# Patient Record
Sex: MALE | Race: WHITE | NOT HISPANIC OR LATINO | Employment: OTHER | RURAL
[De-identification: names, ages, dates, MRNs, and addresses within clinical notes are randomized per-mention and may not be internally consistent; named-entity substitution may affect disease eponyms.]

---

## 2019-08-22 ENCOUNTER — OFFICE VISIT (OUTPATIENT)
Dept: FAMILY MEDICINE CLINIC | Facility: CLINIC | Age: 54
End: 2019-08-22

## 2019-08-22 VITALS
OXYGEN SATURATION: 93 % | HEART RATE: 59 BPM | WEIGHT: 239.6 LBS | BODY MASS INDEX: 32.45 KG/M2 | RESPIRATION RATE: 18 BRPM | TEMPERATURE: 98 F | SYSTOLIC BLOOD PRESSURE: 126 MMHG | HEIGHT: 72 IN | DIASTOLIC BLOOD PRESSURE: 79 MMHG

## 2019-08-22 DIAGNOSIS — K21.9 GASTROESOPHAGEAL REFLUX DISEASE WITHOUT ESOPHAGITIS: ICD-10-CM

## 2019-08-22 DIAGNOSIS — Z00.00 PREVENTATIVE HEALTH CARE: Primary | ICD-10-CM

## 2019-08-22 DIAGNOSIS — Z12.11 COLON CANCER SCREENING: ICD-10-CM

## 2019-08-22 DIAGNOSIS — R06.09 DYSPNEA ON EXERTION: ICD-10-CM

## 2019-08-22 DIAGNOSIS — Z12.5 SCREENING FOR PROSTATE CANCER: ICD-10-CM

## 2019-08-22 DIAGNOSIS — Z13.220 SCREENING, LIPID: ICD-10-CM

## 2019-08-22 DIAGNOSIS — B19.20 HEPATITIS C VIRUS INFECTION WITHOUT HEPATIC COMA, UNSPECIFIED CHRONICITY: ICD-10-CM

## 2019-08-22 DIAGNOSIS — F17.200 TOBACCO DEPENDENCE: ICD-10-CM

## 2019-08-22 DIAGNOSIS — R80.0 ISOLATED PROTEINURIA WITHOUT SPECIFIC MORPHOLOGIC LESION: ICD-10-CM

## 2019-08-22 PROBLEM — Z87.898 HISTORY OF SUBSTANCE USE DISORDER: Status: ACTIVE | Noted: 2019-08-22

## 2019-08-22 LAB
BILIRUB BLD-MCNC: NEGATIVE MG/DL
CLARITY, POC: CLEAR
COLOR UR: YELLOW
GLUCOSE UR STRIP-MCNC: NEGATIVE MG/DL
KETONES UR QL: NEGATIVE
LEUKOCYTE EST, POC: NEGATIVE
NITRITE UR-MCNC: NEGATIVE MG/ML
PH UR: 7 [PH] (ref 5–8)
PROT UR STRIP-MCNC: ABNORMAL MG/DL
RBC # UR STRIP: NEGATIVE /UL
SP GR UR: 1.02 (ref 1–1.03)
UROBILINOGEN UR QL: NORMAL

## 2019-08-22 PROCEDURE — 99203 OFFICE O/P NEW LOW 30 MIN: CPT | Performed by: NURSE PRACTITIONER

## 2019-08-22 PROCEDURE — 99386 PREV VISIT NEW AGE 40-64: CPT | Performed by: NURSE PRACTITIONER

## 2019-08-22 PROCEDURE — 81003 URINALYSIS AUTO W/O SCOPE: CPT | Performed by: NURSE PRACTITIONER

## 2019-08-22 RX ORDER — VARENICLINE TARTRATE 1 MG/1
1 TABLET, FILM COATED ORAL 2 TIMES DAILY
Qty: 60 TABLET | Refills: 4 | Status: SHIPPED | OUTPATIENT
Start: 2019-08-22 | End: 2022-12-08

## 2019-08-22 NOTE — PATIENT INSTRUCTIONS
For more information:    Quit Now [place]  1-800-QUIT-NOW  Steps to Quit Smoking  Smoking tobacco can be harmful to your health and can affect almost every organ in your body. Smoking puts you, and those around you, at risk for developing many serious chronic diseases. Quitting smoking is difficult, but it is one of the best things that you can do for your health. It is never too late to quit.  What are the benefits of quitting smoking?  When you quit smoking, you lower your risk of developing serious diseases and conditions, such as:  · Lung cancer or lung disease, such as COPD.  · Heart disease.  · Stroke.  · Heart attack.  · Infertility.  · Osteoporosis and bone fractures.  Additionally, symptoms such as coughing, wheezing, and shortness of breath may get better when you quit. You may also find that you get sick less often because your body is stronger at fighting off colds and infections. If you are pregnant, quitting smoking can help to reduce your chances of having a baby of low birth weight.  How do I get ready to quit?  When you decide to quit smoking, create a plan to make sure that you are successful. Before you quit:  · Pick a date to quit. Set a date within the next two weeks to give you time to prepare.  · Write down the reasons why you are quitting. Keep this list in places where you will see it often, such as on your bathroom mirror or in your car or wallet.  · Identify the people, places, things, and activities that make you want to smoke (triggers) and avoid them. Make sure to take these actions:  ¨ Throw away all cigarettes at home, at work, and in your car.  ¨ Throw away smoking accessories, such as ashtrays and lighters.  ¨ Clean your car and make sure to empty the ashtray.  ¨ Clean your home, including curtains and carpets.  · Tell your family, friends, and coworkers that you are quitting. Support from your loved ones can make quitting easier.  · Talk with your health care provider about your  options for quitting smoking.  · Find out what treatment options are covered by your health insurance.  What strategies can I use to quit smoking?  Talk with your healthcare provider about different strategies to quit smoking. Some strategies include:  · Quitting smoking altogether instead of gradually lessening how much you smoke over a period of time. Research shows that quitting “cold turkey” is more successful than gradually quitting.  · Attending in-person counseling to help you build problem-solving skills. You are more likely to have success in quitting if you attend several counseling sessions. Even short sessions of 10 minutes can be effective.  · Finding resources and support systems that can help you to quit smoking and remain smoke-free after you quit. These resources are most helpful when you use them often. They can include:  ¨ Online chats with a counselor.  ¨ Telephone quitlines.  ¨ Printed self-help materials.  ¨ Support groups or group counseling.  ¨ Text messaging programs.  ¨ Mobile phone applications.  · Taking medicines to help you quit smoking. (If you are pregnant or breastfeeding, talk with your health care provider first.) Some medicines contain nicotine and some do not. Both types of medicines help with cravings, but the medicines that include nicotine help to relieve withdrawal symptoms. Your health care provider may recommend:  ¨ Nicotine patches, gum, or lozenges.  ¨ Nicotine inhalers or sprays.  ¨ Non-nicotine medicine that is taken by mouth.  Talk with your health care provider about combining strategies, such as taking medicines while you are also receiving in-person counseling. Using these two strategies together makes you more likely to succeed in quitting than if you used either strategy on its own.  If you are pregnant or breastfeeding, talk with your health care provider about finding counseling or other support strategies to quit smoking. Do not take medicine to help you quit  smoking unless told to do so by your health care provider.  What things can I do to make it easier to quit?  Quitting smoking might feel overwhelming at first, but there is a lot that you can do to make it easier. Take these important actions:  · Reach out to your family and friends and ask that they support and encourage you during this time. Call telephone quitlines, reach out to support groups, or work with a counselor for support.  · Ask people who smoke to avoid smoking around you.  · Avoid places that trigger you to smoke, such as bars, parties, or smoke-break areas at work.  · Spend time around people who do not smoke.  · Lessen stress in your life, because stress can be a smoking trigger for some people. To lessen stress, try:  ¨ Exercising regularly.  ¨ Deep-breathing exercises.  ¨ Yoga.  ¨ Meditating.  ¨ Performing a body scan. This involves closing your eyes, scanning your body from head to toe, and noticing which parts of your body are particularly tense. Purposefully relax the muscles in those areas.  · Download or purchase mobile phone or tablet apps (applications) that can help you stick to your quit plan by providing reminders, tips, and encouragement. There are many free apps, such as QuitGuide from the CDC (Centers for Disease Control and Prevention). You can find other support for quitting smoking (smoking cessation) through smokefree.gov and other websites.  How will I feel when I quit smoking?  Within the first 24 hours of quitting smoking, you may start to feel some withdrawal symptoms. These symptoms are usually most noticeable 2-3 days after quitting, but they usually do not last beyond 2-3 weeks. Changes or symptoms that you might experience include:  · Mood swings.  · Restlessness, anxiety, or irritation.  · Difficulty concentrating.  · Dizziness.  · Strong cravings for sugary foods in addition to nicotine.  · Mild weight gain.  · Constipation.  · Nausea.  · Coughing or a sore  throat.  · Changes in how your medicines work in your body.  · A depressed mood.  · Difficulty sleeping (insomnia).  After the first 2-3 weeks of quitting, you may start to notice more positive results, such as:  · Improved sense of smell and taste.  · Decreased coughing and sore throat.  · Slower heart rate.  · Lower blood pressure.  · Clearer skin.  · The ability to breathe more easily.  · Fewer sick days.  Quitting smoking is very challenging for most people. Do not get discouraged if you are not successful the first time. Some people need to make many attempts to quit before they achieve long-term success. Do your best to stick to your quit plan, and talk with your health care provider if you have any questions or concerns.  This information is not intended to replace advice given to you by your health care provider. Make sure you discuss any questions you have with your health care provider.  Document Released: 12/12/2002 Document Revised: 08/15/2017 Document Reviewed: 05/03/2016  Elsevier Interactive Patient Education © 2017 [employer]

## 2019-08-22 NOTE — PROGRESS NOTES
Chief Complaint   Patient presents with   • Annual Exam        Subjective     Neftali Rodrigues  has a past medical history of GERD (gastroesophageal reflux disease), Hepatitis C virus infection without hepatic coma (8/22/2019), History of substance use disorder (8/22/2019), and Tobacco dependence (8/22/2019).    HPI:  This is a new patient who presents for his annual wellness visit and to follow-up on some chronic problems.   He was diagnosed with Hepatitis C many years ago, presumably from injection drug use or a tattoo. He was treated by Dr. Rock (he thinks) with interferon. He reports completing the treatment, but he has not gone back to Dr. Rock for follow-up for several years. He has felt well. He denies abdominal pain, bowel problems, fatigue, jaundice.     He is interested in quitting smoking. He has been smoking 1 ppd for the last 40 years. He has not been successful at quitting in the past. He has mild dyspnea on exertion, but denies coughing or sputum production.     He also has heartburn frequently. This has been a problem for months. He believes it is because his new upper denture plate does not fit well and he doesn't get his food chewed up properly. If he really gets food chewed up well he doesn't have the problem. He denies dsyphagia, hoarseness, vomiting.     PHQ-2 Depression Screening  Little interest or pleasure in doing things? 0   Feeling down, depressed, or hopeless? 0   PHQ-2 Total Score 0   No Known Allergies      Current Outpatient Medications:   •  varenicline (CHANTIX CONTINUING MONTH AMEE) 1 MG tablet, Take 1 tablet by mouth 2 (Two) Times a Day., Disp: 60 tablet, Rfl: 4  •  varenicline (CHANTIX STARTING MONTH AMEE) 0.5 MG X 11 & 1 MG X 42 tablet, Take 0.5 mg one daily on days 1-3 and and 0.5 mg twice daily on days 4-7.Then 1 mg twice daily for a total of 12 weeks., Disp: 53 tablet, Rfl: 0    Past Medical History:   Diagnosis Date   • GERD (gastroesophageal reflux disease)    •  "Hepatitis C virus infection without hepatic coma 8/22/2019   • History of substance use disorder 8/22/2019   • Tobacco dependence 8/22/2019       Past Surgical History:   Procedure Laterality Date   • CYST REMOVAL  2012    in left groin (I & D)   • EYE SURGERY  05/2019    Laser - bilateral       Social History     Socioeconomic History   • Marital status:      Spouse name: Not on file   • Number of children: Not on file   • Years of education: Not on file   • Highest education level: Not on file   Tobacco Use   • Smoking status: Current Every Day Smoker     Packs/day: 1.00     Years: 40.00     Pack years: 40.00     Start date: 8/22/1979   • Smokeless tobacco: Never Used   Substance and Sexual Activity   • Alcohol use: No     Frequency: Never     Comment: Former 2015   • Drug use: Yes     Types: Benzodiazepines, Amphetamines, Heroin, Hydrocodone, Marijuana, Methamphetamines, Cocaine, \"Crack\" cocaine, LSD     Comment: Former 2015, multiple       Family History   Problem Relation Age of Onset   • Migraines Mother    • Lung cancer Mother    • Hypertension Father    • Alcohol abuse Father    • Diabetes Father    • Coronary artery disease Father    • Hypertension Brother    • Coronary artery disease Brother    • Hypertension Cousin    • Diabetes Cousin    • Arthritis Paternal Aunt    • Heart attack Paternal Uncle        Family history, surgical history, past medical history, Allergies and med's reviewed with patient today and updated in [facility] EMR.     ROS:  Review of Systems   Constitutional: Negative for appetite change, chills, diaphoresis, fatigue, fever, unexpected weight gain and unexpected weight loss.   HENT: Negative for congestion, ear discharge, ear pain, hearing loss, mouth sores, nosebleeds, postnasal drip, rhinorrhea, sinus pressure, sneezing, sore throat, tinnitus, trouble swallowing and voice change.    Eyes: Negative for blurred vision, double vision, photophobia, pain, discharge, redness, itching " "and visual disturbance.   Respiratory: Positive for shortness of breath. Negative for apnea, cough and wheezing.         W/exertion   Cardiovascular: Negative for chest pain, palpitations and leg swelling.   Gastrointestinal: Positive for GERD. Negative for abdominal distention, abdominal pain, blood in stool, constipation, diarrhea, nausea, rectal pain, vomiting and indigestion.        Thinks it is due to new dental plate - hard to chew his food up enough   Endocrine: Negative for cold intolerance, heat intolerance, polydipsia, polyphagia and polyuria.   Genitourinary: Negative for breast discharge, decreased libido, decreased urine volume, difficulty urinating, discharge, dysuria, flank pain, frequency, genital sores, hematuria, nocturia, penile pain, erectile dysfunction, penile swelling, scrotal swelling, testicular pain, urgency and urinary incontinence.   Musculoskeletal: Negative for arthralgias, back pain, joint swelling, myalgias, neck pain and neck stiffness.   Skin: Negative for rash and skin lesions.   Allergic/Immunologic: Negative for environmental allergies.   Neurological: Negative for dizziness, tremors, seizures, syncope, weakness, light-headedness, numbness, headache and memory problem.   Hematological: Negative for adenopathy. Does not bruise/bleed easily.   Psychiatric/Behavioral: Negative for self-injury, sleep disturbance, suicidal ideas and depressed mood. The patient is not nervous/anxious.      OBJECTIVE:  Vitals:    08/22/19 1320   BP: 126/79   BP Location: Left arm   Patient Position: Sitting   Cuff Size: Large Adult   Pulse: 59   Resp: 18   Temp: 98 °F (36.7 °C)   TempSrc: Oral   SpO2: 93%   Weight: 109 kg (239 lb 9.6 oz)   Height: 181.6 cm (71.5\")     Body mass index is 32.95 kg/m².    Physical Exam   Constitutional: He is oriented to person, place, and time. He appears well-developed and well-nourished.   HENT:   Head: Normocephalic and atraumatic.   Right Ear: External ear normal. " Tympanic membrane is not erythematous, not retracted and not bulging.   Left Ear: External ear normal. Tympanic membrane is not erythematous, not retracted and not bulging.   Nose: Nose normal. Right sinus exhibits no maxillary sinus tenderness and no frontal sinus tenderness. Left sinus exhibits no maxillary sinus tenderness and no frontal sinus tenderness.   Mouth/Throat: Oropharynx is clear and moist and mucous membranes are normal. No oropharyngeal exudate.   Eyes: Conjunctivae, EOM and lids are normal. Pupils are equal, round, and reactive to light. Right eye exhibits no discharge. Left eye exhibits no discharge.   Neck: Normal range of motion. Neck supple. Carotid bruit is not present. No tracheal deviation present. No thyromegaly present.   Cardiovascular: Normal rate, regular rhythm, normal heart sounds and intact distal pulses. Exam reveals no gallop and no friction rub.   No murmur heard.  Pulmonary/Chest: Effort normal and breath sounds normal. He has no wheezes. He has no rales.   Abdominal: Soft. Bowel sounds are normal. There is no hepatosplenomegaly. There is no tenderness. No hernia.   Musculoskeletal: Normal range of motion. He exhibits no edema or deformity.   Lymphadenopathy:     He has no cervical adenopathy.   Neurological: He is alert and oriented to person, place, and time. He has normal strength. He displays normal reflexes. Gait normal.   Skin: Skin is warm and dry. No rash noted.   Psychiatric: He has a normal mood and affect. His speech is normal and behavior is normal. Judgment and thought content normal.       Office Visit on 08/22/2019   Component Date Value Ref Range Status   • Color 08/22/2019 Yellow  Yellow, Straw, Dark Yellow, [place] Final   • Clarity, UA 08/22/2019 Clear  Clear Final   • Specific Gravity  08/22/2019 1.020  1.005 - 1.030 Final   • pH, Urine 08/22/2019 7.0  5.0 - 8.0 Final   • Leukocytes 08/22/2019 Negative  Negative Final   • Nitrite, UA 08/22/2019 Negative  Negative  Final   • Protein, POC 08/22/2019 30 mg/dL* Negative mg/dL Final   • Glucose, UA 08/22/2019 Negative  Negative, 1000 mg/dL (3+) mg/dL Final   • Ketones, UA 08/22/2019 Negative  Negative Final   • Urobilinogen, UA 08/22/2019 Normal  Normal Final   • Bilirubin 08/22/2019 Negative  Negative Final   • Blood, UA 08/22/2019 Negative  Negative Final       ASSESSMENT/ PLAN:    Diagnoses and all orders for this visit:    1. Preventative health care (Primary)  Comments:  Discussed age-appropriate health maintenance, prevention and promotion.   Given info for vascular screenings at [facility], including Cardiac Calcium score test.   Orders:  -     POC Urinalysis Dipstick, Automated    2. Hepatitis C virus infection without hepatic coma, unspecified chronicity  Comments:  Completed interferon treatment several years ago.   Has not seen GI in many years. Recommend return to Dr. Rock for follow-up.   Orders:  -     Ambulatory Referral to Gastroenterology  -     CBC & Differential; Future  -     Comprehensive Metabolic Panel; Future    3. 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   Checking Creatinine.     4. Tobacco dependence  Comments:  Discussed cessation techniques, risks/benefits of Chantix use.  Follow-up in 1 month.   Orders:  -     Spirometry Without Bronchodilator    5. Dyspnea on exertion  Comments:  Normal PFT's. Likely due to chronic tobacco use.   Given info for low-dose Chest CT at [facility].   f/up in 1 month.   Orders:  -     Spirometry Without Bronchodilator    6. Gastroesophageal reflux disease without esophagitis  Comments:  possible. He will discuss this with Dr. Rock also.     7. Colon cancer screening  Comments:  Given GSI packet. He will discuss with Dr. Rock when he sees him.   Orders:  -     Ambulatory Referral to Gastroenterology    8. Screening, lipid  -     Lipid Panel; Future    9. Screening for prostate cancer  Comments:  Reviewed risks/benefits  of PSA testing.  Orders:  -     PSA Screen; Future    Other orders  -     varenicline (CHANTIX STARTING MONTH AMEE) 0.5 MG X 11 & 1 MG X 42 tablet; Take 0.5 mg one daily on days 1-3 and and 0.5 mg twice daily on days 4-7.Then 1 mg twice daily for a total of 12 weeks.  Dispense: 53 tablet; Refill: 0  -     varenicline (CHANTIX CONTINUING MONTH AMEE) 1 MG tablet; Take 1 tablet by mouth 2 (Two) Times a Day.  Dispense: 60 tablet; Refill: 4        Orders Placed Today:     New Medications Ordered This Visit   Medications   • varenicline (CHANTIX STARTING MONTH AMEE) 0.5 MG X 11 & 1 MG X 42 tablet     Sig: Take 0.5 mg one daily on days 1-3 and and 0.5 mg twice daily on days 4-7.Then 1 mg twice daily for a total of 12 weeks.     Dispense:  53 tablet     Refill:  0   • varenicline (CHANTIX CONTINUING MONTH AMEE) 1 MG tablet     Sig: Take 1 tablet by mouth 2 (Two) Times a Day.     Dispense:  60 tablet     Refill:  4        Management Plan:     An After Visit Summary was printed and given to the patient at discharge.    Follow-up: Return in about 1 month (around 9/22/2019) for Recheck smoking, protein in urine.    MELANIE Marie 8/22/2019 4:47 PM  This note was electronically signed.

## 2019-08-23 ENCOUNTER — RESULTS ENCOUNTER (OUTPATIENT)
Dept: FAMILY MEDICINE CLINIC | Facility: CLINIC | Age: 54
End: 2019-08-23

## 2019-08-23 DIAGNOSIS — Z12.5 SCREENING FOR PROSTATE CANCER: ICD-10-CM

## 2019-08-23 DIAGNOSIS — Z13.220 SCREENING, LIPID: ICD-10-CM

## 2019-08-23 DIAGNOSIS — B19.20 HEPATITIS C VIRUS INFECTION WITHOUT HEPATIC COMA, UNSPECIFIED CHRONICITY: ICD-10-CM

## 2019-08-27 ENCOUNTER — TRANSCRIBE ORDERS (OUTPATIENT)
Dept: ADMINISTRATIVE | Facility: HOSPITAL | Age: 54
End: 2019-08-27

## 2019-08-27 DIAGNOSIS — Z13.6 ENCOUNTER FOR SCREENING FOR VASCULAR DISEASE: Primary | ICD-10-CM

## 2019-08-28 PROBLEM — E78.2 MIXED HYPERLIPIDEMIA: Status: ACTIVE | Noted: 2019-08-28

## 2019-08-28 LAB
ALBUMIN SERPL-MCNC: 4.2 G/DL (ref 3.5–5.5)
ALBUMIN/GLOB SERPL: 1.6 {RATIO} (ref 1.2–2.2)
ALP SERPL-CCNC: 57 IU/L (ref 39–117)
ALT SERPL-CCNC: 40 IU/L (ref 0–44)
AST SERPL-CCNC: 30 IU/L (ref 0–40)
BASOPHILS # BLD AUTO: 0.1 X10E3/UL (ref 0–0.2)
BASOPHILS NFR BLD AUTO: 1 %
BILIRUB SERPL-MCNC: 0.4 MG/DL (ref 0–1.2)
BUN SERPL-MCNC: 8 MG/DL (ref 6–24)
BUN/CREAT SERPL: 10 (ref 9–20)
CALCIUM SERPL-MCNC: 9.4 MG/DL (ref 8.7–10.2)
CHLORIDE SERPL-SCNC: 103 MMOL/L (ref 96–106)
CHOLEST SERPL-MCNC: 185 MG/DL (ref 100–199)
CO2 SERPL-SCNC: 21 MMOL/L (ref 20–29)
CREAT SERPL-MCNC: 0.79 MG/DL (ref 0.76–1.27)
EOSINOPHIL # BLD AUTO: 0.4 X10E3/UL (ref 0–0.4)
EOSINOPHIL NFR BLD AUTO: 5 %
ERYTHROCYTE [DISTWIDTH] IN BLOOD BY AUTOMATED COUNT: 12.9 % (ref 12.3–15.4)
GLOBULIN SER CALC-MCNC: 2.7 G/DL (ref 1.5–4.5)
GLUCOSE SERPL-MCNC: 84 MG/DL (ref 65–99)
HCT VFR BLD AUTO: 48.6 % (ref 37.5–51)
HDLC SERPL-MCNC: 39 MG/DL
HGB BLD-MCNC: 17.4 G/DL (ref 13–17.7)
IMM GRANULOCYTES # BLD AUTO: 0 X10E3/UL (ref 0–0.1)
IMM GRANULOCYTES NFR BLD AUTO: 0 %
LDLC SERPL CALC-MCNC: 102 MG/DL (ref 0–99)
LYMPHOCYTES # BLD AUTO: 2.8 X10E3/UL (ref 0.7–3.1)
LYMPHOCYTES NFR BLD AUTO: 34 %
MCH RBC QN AUTO: 32.1 PG (ref 26.6–33)
MCHC RBC AUTO-ENTMCNC: 35.8 G/DL (ref 31.5–35.7)
MCV RBC AUTO: 90 FL (ref 79–97)
MONOCYTES # BLD AUTO: 0.6 X10E3/UL (ref 0.1–0.9)
MONOCYTES NFR BLD AUTO: 8 %
NEUTROPHILS # BLD AUTO: 4.2 X10E3/UL (ref 1.4–7)
NEUTROPHILS NFR BLD AUTO: 52 %
PLATELET # BLD AUTO: 211 X10E3/UL (ref 150–450)
POTASSIUM SERPL-SCNC: 4.4 MMOL/L (ref 3.5–5.2)
PROT SERPL-MCNC: 6.9 G/DL (ref 6–8.5)
PSA SERPL-MCNC: 0.4 NG/ML (ref 0–4)
RBC # BLD AUTO: 5.42 X10E6/UL (ref 4.14–5.8)
SODIUM SERPL-SCNC: 140 MMOL/L (ref 134–144)
TRIGL SERPL-MCNC: 220 MG/DL (ref 0–149)
VLDLC SERPL CALC-MCNC: 44 MG/DL (ref 5–40)
WBC # BLD AUTO: 8.1 X10E3/UL (ref 3.4–10.8)

## 2019-09-04 PROBLEM — J43.8 OTHER EMPHYSEMA (HCC): Status: ACTIVE | Noted: 2019-09-04

## 2019-09-25 ENCOUNTER — OFFICE VISIT (OUTPATIENT)
Dept: FAMILY MEDICINE CLINIC | Facility: CLINIC | Age: 54
End: 2019-09-25

## 2019-09-25 VITALS
DIASTOLIC BLOOD PRESSURE: 82 MMHG | RESPIRATION RATE: 18 BRPM | WEIGHT: 241.2 LBS | HEART RATE: 72 BPM | SYSTOLIC BLOOD PRESSURE: 120 MMHG | BODY MASS INDEX: 32.67 KG/M2 | OXYGEN SATURATION: 92 % | HEIGHT: 72 IN | TEMPERATURE: 98.1 F

## 2019-09-25 DIAGNOSIS — Z12.11 COLON CANCER SCREENING: ICD-10-CM

## 2019-09-25 DIAGNOSIS — B19.20 HEPATITIS C VIRUS INFECTION WITHOUT HEPATIC COMA, UNSPECIFIED CHRONICITY: ICD-10-CM

## 2019-09-25 DIAGNOSIS — R80.9 PROTEINURIA, UNSPECIFIED TYPE: ICD-10-CM

## 2019-09-25 DIAGNOSIS — J43.8 OTHER EMPHYSEMA (HCC): Primary | ICD-10-CM

## 2019-09-25 DIAGNOSIS — R31.21 ASYMPTOMATIC MICROSCOPIC HEMATURIA: ICD-10-CM

## 2019-09-25 DIAGNOSIS — E78.2 MIXED HYPERLIPIDEMIA: ICD-10-CM

## 2019-09-25 DIAGNOSIS — F17.200 TOBACCO DEPENDENCE: ICD-10-CM

## 2019-09-25 DIAGNOSIS — K21.9 GASTROESOPHAGEAL REFLUX DISEASE WITHOUT ESOPHAGITIS: ICD-10-CM

## 2019-09-25 LAB
BILIRUB BLD-MCNC: NEGATIVE MG/DL
CLARITY, POC: CLEAR
COLOR UR: YELLOW
GLUCOSE UR STRIP-MCNC: NEGATIVE MG/DL
KETONES UR QL: NEGATIVE
LEUKOCYTE EST, POC: NEGATIVE
NITRITE UR-MCNC: NEGATIVE MG/ML
PH UR: 5.5 [PH] (ref 5–8)
PROT UR STRIP-MCNC: NEGATIVE MG/DL
RBC # UR STRIP: ABNORMAL /UL
SP GR UR: 1.02 (ref 1–1.03)
UROBILINOGEN UR QL: NORMAL

## 2019-09-25 PROCEDURE — 81003 URINALYSIS AUTO W/O SCOPE: CPT | Performed by: NURSE PRACTITIONER

## 2019-09-25 PROCEDURE — 99213 OFFICE O/P EST LOW 20 MIN: CPT | Performed by: NURSE PRACTITIONER

## 2019-09-25 NOTE — PROGRESS NOTES
Chief Complaint   Patient presents with   • Nicotine Dependence   • Proteinuria   • Hyperlipidemia   • COPD        Subjective     Neftali Rodrigues  has a past medical history of GERD (gastroesophageal reflux disease), Hepatitis C virus infection without hepatic coma (8/22/2019), History of substance use disorder (8/22/2019), Other emphysema (CMS/HCC) (9/4/2019), and Tobacco dependence (8/22/2019).    Hyperlipidemia   This is a new problem. This is a new diagnosis. The problem is uncontrolled. Recent lipid tests were reviewed and are variable. Factors aggravating his hyperlipidemia include smoking. Associated symptoms include shortness of breath. Pertinent negatives include no chest pain, leg pain or myalgias. He is currently on no antihyperlipidemic treatment. Risk factors for coronary artery disease include dyslipidemia, male sex and family history.   Smoking Cessation   He has been smoking 1 ppd for the last 40 years. He is taking Chantix and has been able to cut back to about 3/4 ppd. He is not having cravings. He has changed some of his daily routine to help him quit. He is not having nausea. He has had vivid dreams, but nothing disturbing. He has had no mood changes or depression.   Emphysema  He had a low-dose chest CT done at [facility]. It did not show any suspicious nodules, but it did show mild emphysema and mild calcifications of the arteries. He had normal PFT's at his August appointment. He has mild dyspnea on exertion, but denies coughing or sputum production.   Proteinuria  His Urinalysis in August showed 30 mg/dL protein. Serum creatinine was unremarkable.     No Known Allergies      Current Outpatient Medications:   •  varenicline (CHANTIX CONTINUING MONTH AMEE) 1 MG tablet, Take 1 tablet by mouth 2 (Two) Times a Day., Disp: 60 tablet, Rfl: 4    Past Medical History:   Diagnosis Date   • GERD (gastroesophageal reflux disease)    • Hepatitis C virus infection without hepatic coma  "8/22/2019   • History of substance use disorder 8/22/2019   • Other emphysema (CMS/HCC) 9/4/2019   • Tobacco dependence 8/22/2019       Past Surgical History:   Procedure Laterality Date   • CYST REMOVAL  2012    in left groin (I & D)   • EYE SURGERY  05/2019    Laser - bilateral       Social History     Socioeconomic History   • Marital status:      Spouse name: Not on file   • Number of children: Not on file   • Years of education: Not on file   • Highest education level: Not on file   Tobacco Use   • Smoking status: Current Every Day Smoker     Packs/day: 0.75     Years: 40.00     Pack years: 30.00     Start date: 8/22/1979   • Smokeless tobacco: Never Used   Substance and Sexual Activity   • Alcohol use: No     Frequency: Never     Comment: Former 2015   • Drug use: Yes     Types: Benzodiazepines, Amphetamines, Heroin, Hydrocodone, Marijuana, Methamphetamines, Cocaine, \"Crack\" cocaine, LSD     Comment: Former 2015, multiple       Family History   Problem Relation Age of Onset   • Migraines Mother    • Lung cancer Mother    • Hypertension Father    • Alcohol abuse Father    • Diabetes Father    • Coronary artery disease Father    • Hypertension Brother    • Coronary artery disease Brother    • Hypertension Cousin    • Diabetes Cousin    • Arthritis Paternal Aunt    • Heart attack Paternal Uncle        Family history, surgical history, past medical history, Allergies and med's reviewed with patient today and updated in [employer] EMR.     ROS:  Review of Systems   Constitutional: Negative for activity change, appetite change, diaphoresis, fatigue, unexpected weight gain and unexpected weight loss.   Eyes: Negative for blurred vision and visual disturbance.   Respiratory: Positive for shortness of breath. Negative for apnea, cough and wheezing.    Cardiovascular: Negative for chest pain, palpitations and leg swelling.   Gastrointestinal: Negative for abdominal pain, constipation, diarrhea, nausea and vomiting. " "  Endocrine: Negative for cold intolerance and heat intolerance.   Genitourinary: Negative for dysuria, flank pain, frequency and urgency.   Musculoskeletal: Negative for myalgias.   Neurological: Negative for dizziness, syncope and headache.   Psychiatric/Behavioral: Negative for depressed mood.       OBJECTIVE:  Vitals:    09/25/19 1524   BP: 120/82   BP Location: Left arm   Patient Position: Sitting   Cuff Size: Large Adult   Pulse: 72   Resp: 18   Temp: 98.1 °F (36.7 °C)   TempSrc: Oral   SpO2: 92%   Weight: 109 kg (241 lb 3.2 oz)   Height: 181.6 cm (71.5\")     Body mass index is 33.17 kg/m².    Physical Exam   Constitutional: He is oriented to person, place, and time. He appears well-developed and well-nourished.   Neck: Trachea normal and normal range of motion. Neck supple. Carotid bruit is not present. No thyromegaly present.   Cardiovascular: Normal rate, regular rhythm, normal heart sounds and intact distal pulses. Exam reveals no gallop and no friction rub.   No murmur heard.  Pulmonary/Chest: Effort normal and breath sounds normal. He has no wheezes. He has no rales.   Abdominal: Soft. Bowel sounds are normal. There is no hepatosplenomegaly. No hernia.   Musculoskeletal: Normal range of motion. He exhibits no edema.   Lymphadenopathy:     He has no cervical adenopathy.   Neurological: He is alert and oriented to person, place, and time.   Skin: Skin is warm and dry.   Psychiatric: He has a normal mood and affect. His behavior is normal. Judgment and thought content normal.       Office Visit on 09/25/2019   Component Date Value Ref Range Status   • Color 09/25/2019 Yellow  Yellow, Straw, Dark Yellow, Jesenia Final   • Clarity, UA 09/25/2019 Clear  Clear Final   • Specific Gravity  09/25/2019 1.025  1.005 - 1.030 Final   • pH, Urine 09/25/2019 5.5  5.0 - 8.0 Final   • Leukocytes 09/25/2019 Negative  Negative Final   • Nitrite, UA 09/25/2019 Negative  Negative Final   • Protein, POC 09/25/2019 Negative  " Negative mg/dL Final   • Glucose, UA 09/25/2019 Negative  Negative, 1000 mg/dL (3+) mg/dL Final   • Ketones, UA 09/25/2019 Negative  Negative Final   • Urobilinogen, UA 09/25/2019 Normal  Normal Final   • Bilirubin 09/25/2019 Negative  Negative Final   • Blood, UA 09/25/2019 Trace* Negative Final       ASSESSMENT/ PLAN:    Diagnoses and all orders for this visit:    1. Other emphysema (CMS/HCC) (Primary)  Comments:  Reviewed CT findings. Strongly encouraged tobacco cessation.     2. Proteinuria, unspecified type  Comments:  resolved.  Orders:  -     POC Urinalysis Dipstick, Automated    3. Mixed hyperlipidemia  Comments:  Reviewed his 10-year ASCVD risk. Recommended statin therapy. He would like to focus on tobacco cessation for now.     4. Asymptomatic microscopic hematuria  Comments:  Was not present last month. Will recheck in 6-8 weeks.     5. Tobacco dependence  Comments:  Encouraged him to set a stop date. Continue to wean down.  Continue Chantix.     6. Hepatitis C virus infection without hepatic coma, unspecified chronicity  Comments:  He has scheduled an appt w/GI.    7. Gastroesophageal reflux disease without esophagitis  Comments:  He will discuss w/GI.    8. Colon cancer screening  Comments:  Will discuss w/GI.        Orders Placed Today:     No orders of the defined types were placed in this encounter.       Management Plan:     An After Visit Summary was printed and given to the patient at discharge.    Follow-up: Return in about 2 months (around 11/25/2019) for Recheck hematuria, tobacco cessation.    MELANIE Marie 9/26/2019 5:18 PM  This note was electronically signed.

## 2019-10-07 ENCOUNTER — OFFICE (OUTPATIENT)
Dept: URBAN - METROPOLITAN AREA CLINIC 64 | Facility: CLINIC | Age: 54
End: 2019-10-07

## 2019-10-07 VITALS
HEIGHT: 73 IN | HEART RATE: 57 BPM | DIASTOLIC BLOOD PRESSURE: 76 MMHG | SYSTOLIC BLOOD PRESSURE: 117 MMHG | WEIGHT: 244 LBS

## 2019-10-07 DIAGNOSIS — B18.2 CHRONIC VIRAL HEPATITIS C: ICD-10-CM

## 2019-10-07 PROCEDURE — 99203 OFFICE O/P NEW LOW 30 MIN: CPT | Performed by: INTERNAL MEDICINE

## 2019-10-31 ENCOUNTER — HOSPITAL ENCOUNTER (OUTPATIENT)
Facility: HOSPITAL | Age: 54
Setting detail: HOSPITAL OUTPATIENT SURGERY
End: 2019-10-31
Attending: INTERNAL MEDICINE | Admitting: INTERNAL MEDICINE

## 2019-11-19 ENCOUNTER — APPOINTMENT (OUTPATIENT)
Dept: CT IMAGING | Facility: HOSPITAL | Age: 54
End: 2019-11-19

## 2019-11-19 ENCOUNTER — APPOINTMENT (OUTPATIENT)
Dept: CARDIOLOGY | Facility: HOSPITAL | Age: 54
End: 2019-11-19

## 2020-03-19 ENCOUNTER — TELEPHONE (OUTPATIENT)
Dept: FAMILY MEDICINE CLINIC | Facility: CLINIC | Age: 55
End: 2020-03-19

## 2020-03-19 NOTE — TELEPHONE ENCOUNTER
----- Message from MELANIE Marie sent at 3/19/2020  4:14 PM EDT -----  Regarding: [facility] records  Please get most recent office notes and labs from [facility]. Also see if they have done a colonoscopy.

## 2022-12-07 ENCOUNTER — APPOINTMENT (OUTPATIENT)
Dept: CT IMAGING | Facility: HOSPITAL | Age: 57
End: 2022-12-07

## 2022-12-07 ENCOUNTER — APPOINTMENT (OUTPATIENT)
Dept: GENERAL RADIOLOGY | Facility: HOSPITAL | Age: 57
End: 2022-12-07

## 2022-12-07 ENCOUNTER — HOSPITAL ENCOUNTER (INPATIENT)
Facility: HOSPITAL | Age: 57
LOS: 2 days | Discharge: HOME | End: 2022-12-09
Attending: EMERGENCY MEDICINE

## 2022-12-07 DIAGNOSIS — I26.99 OTHER ACUTE PULMONARY EMBOLISM WITHOUT ACUTE COR PULMONALE: ICD-10-CM

## 2022-12-07 DIAGNOSIS — J18.9 PNEUMONIA DUE TO INFECTIOUS ORGANISM, UNSPECIFIED LATERALITY, UNSPECIFIED PART OF LUNG: ICD-10-CM

## 2022-12-07 DIAGNOSIS — A41.9 SEPSIS, DUE TO UNSPECIFIED ORGANISM, UNSPECIFIED WHETHER ACUTE ORGAN DYSFUNCTION PRESENT: Primary | ICD-10-CM

## 2022-12-07 LAB
ALBUMIN SERPL-MCNC: 3.3 G/DL (ref 3.5–5.2)
ALBUMIN/GLOB SERPL: 1.1 G/DL
ALP SERPL-CCNC: 55 U/L (ref 39–117)
ALT SERPL W P-5'-P-CCNC: 28 U/L (ref 1–41)
ANION GAP SERPL CALCULATED.3IONS-SCNC: 19 MMOL/L (ref 5–15)
APTT PPP: 42.4 SECONDS (ref 61–76.5)
AST SERPL-CCNC: 28 U/L (ref 1–40)
B PARAPERT DNA SPEC QL NAA+PROBE: NOT DETECTED
B PERT DNA SPEC QL NAA+PROBE: NOT DETECTED
BILIRUB SERPL-MCNC: 0.7 MG/DL (ref 0–1.2)
BUN SERPL-MCNC: 38 MG/DL (ref 6–20)
BUN/CREAT SERPL: 23.5 (ref 7–25)
C PNEUM DNA NPH QL NAA+NON-PROBE: NOT DETECTED
CALCIUM SPEC-SCNC: 9.4 MG/DL (ref 8.6–10.5)
CHLORIDE SERPL-SCNC: 94 MMOL/L (ref 98–107)
CO2 SERPL-SCNC: 20 MMOL/L (ref 22–29)
CREAT SERPL-MCNC: 1.62 MG/DL (ref 0.76–1.27)
D DIMER PPP FEU-MCNC: 0.89 MG/L (FEU) (ref 0–0.58)
D-LACTATE SERPL-SCNC: 4 MMOL/L (ref 0.5–2)
DEPRECATED RDW RBC AUTO: 45.5 FL (ref 37–54)
EGFRCR SERPLBLD CKD-EPI 2021: 48.9 ML/MIN/1.73
ERYTHROCYTE [DISTWIDTH] IN BLOOD BY AUTOMATED COUNT: 13.4 % (ref 12.3–15.4)
FLUAV SUBTYP SPEC NAA+PROBE: NOT DETECTED
FLUBV RNA ISLT QL NAA+PROBE: NOT DETECTED
GLOBULIN UR ELPH-MCNC: 2.9 GM/DL
GLUCOSE SERPL-MCNC: 137 MG/DL (ref 65–99)
HADV DNA SPEC NAA+PROBE: NOT DETECTED
HCOV 229E RNA SPEC QL NAA+PROBE: NOT DETECTED
HCOV HKU1 RNA SPEC QL NAA+PROBE: NOT DETECTED
HCOV NL63 RNA SPEC QL NAA+PROBE: NOT DETECTED
HCOV OC43 RNA SPEC QL NAA+PROBE: NOT DETECTED
HCT VFR BLD AUTO: 46.9 % (ref 37.5–51)
HGB BLD-MCNC: 16.2 G/DL (ref 13–17.7)
HMPV RNA NPH QL NAA+NON-PROBE: NOT DETECTED
HPIV1 RNA ISLT QL NAA+PROBE: NOT DETECTED
HPIV2 RNA SPEC QL NAA+PROBE: NOT DETECTED
HPIV3 RNA NPH QL NAA+PROBE: NOT DETECTED
HPIV4 P GENE NPH QL NAA+PROBE: NOT DETECTED
INR PPP: 1.36 (ref 0.93–1.1)
LYMPHOCYTES # BLD MANUAL: 0.7 10*3/MM3 (ref 0.7–3.1)
LYMPHOCYTES NFR BLD MANUAL: 3 % (ref 5–12)
M PNEUMO IGG SER IA-ACNC: NOT DETECTED
MCH RBC QN AUTO: 32.5 PG (ref 26.6–33)
MCHC RBC AUTO-ENTMCNC: 34.7 G/DL (ref 31.5–35.7)
MCV RBC AUTO: 93.6 FL (ref 79–97)
METAMYELOCYTES NFR BLD MANUAL: 7 % (ref 0–0)
MONOCYTES # BLD: 0.42 10*3/MM3 (ref 0.1–0.9)
NEUTROPHILS # BLD AUTO: 11.82 10*3/MM3 (ref 1.7–7)
NEUTROPHILS NFR BLD MANUAL: 46 % (ref 42.7–76)
NEUTS BAND NFR BLD MANUAL: 39 % (ref 0–5)
PLAT MORPH BLD: NORMAL
PLATELET # BLD AUTO: 192 10*3/MM3 (ref 140–450)
PMV BLD AUTO: 7.8 FL (ref 6–12)
POTASSIUM SERPL-SCNC: 4.5 MMOL/L (ref 3.5–5.2)
PROT SERPL-MCNC: 6.2 G/DL (ref 6–8.5)
PROTHROMBIN TIME: 13.8 SECONDS (ref 9.6–11.7)
RBC # BLD AUTO: 5 10*6/MM3 (ref 4.14–5.8)
RBC MORPH BLD: NORMAL
RHINOVIRUS RNA SPEC NAA+PROBE: NOT DETECTED
RSV RNA NPH QL NAA+NON-PROBE: DETECTED
SARS-COV-2 RNA NPH QL NAA+NON-PROBE: NOT DETECTED
SCAN SLIDE: NORMAL
SODIUM SERPL-SCNC: 133 MMOL/L (ref 136–145)
TROPONIN T SERPL-MCNC: <0.01 NG/ML (ref 0–0.03)
VARIANT LYMPHS NFR BLD MANUAL: 5 % (ref 19.6–45.3)
WBC MORPH BLD: NORMAL
WBC NRBC COR # BLD: 13.9 10*3/MM3 (ref 3.4–10.8)

## 2022-12-07 PROCEDURE — 25010000002 HEPARIN (PORCINE) 25000-0.45 UT/250ML-% SOLUTION: Performed by: EMERGENCY MEDICINE

## 2022-12-07 PROCEDURE — 85730 THROMBOPLASTIN TIME PARTIAL: CPT | Performed by: EMERGENCY MEDICINE

## 2022-12-07 PROCEDURE — 0202U NFCT DS 22 TRGT SARS-COV-2: CPT | Performed by: EMERGENCY MEDICINE

## 2022-12-07 PROCEDURE — 93005 ELECTROCARDIOGRAM TRACING: CPT | Performed by: EMERGENCY MEDICINE

## 2022-12-07 PROCEDURE — 25010000002 HYDROMORPHONE 1 MG/ML SOLUTION: Performed by: EMERGENCY MEDICINE

## 2022-12-07 PROCEDURE — 0 IOPAMIDOL PER 1 ML: Performed by: EMERGENCY MEDICINE

## 2022-12-07 PROCEDURE — 71045 X-RAY EXAM CHEST 1 VIEW: CPT

## 2022-12-07 PROCEDURE — 83605 ASSAY OF LACTIC ACID: CPT

## 2022-12-07 PROCEDURE — 25010000002 CEFEPIME PER 500 MG: Performed by: EMERGENCY MEDICINE

## 2022-12-07 PROCEDURE — 85025 COMPLETE CBC W/AUTO DIFF WBC: CPT | Performed by: EMERGENCY MEDICINE

## 2022-12-07 PROCEDURE — 80053 COMPREHEN METABOLIC PANEL: CPT | Performed by: EMERGENCY MEDICINE

## 2022-12-07 PROCEDURE — 99285 EMERGENCY DEPT VISIT HI MDM: CPT

## 2022-12-07 PROCEDURE — 71275 CT ANGIOGRAPHY CHEST: CPT

## 2022-12-07 PROCEDURE — 84484 ASSAY OF TROPONIN QUANT: CPT | Performed by: EMERGENCY MEDICINE

## 2022-12-07 PROCEDURE — 83880 ASSAY OF NATRIURETIC PEPTIDE: CPT | Performed by: INTERNAL MEDICINE

## 2022-12-07 PROCEDURE — 25010000002 ONDANSETRON PER 1 MG: Performed by: EMERGENCY MEDICINE

## 2022-12-07 PROCEDURE — 85007 BL SMEAR W/DIFF WBC COUNT: CPT | Performed by: EMERGENCY MEDICINE

## 2022-12-07 PROCEDURE — 87040 BLOOD CULTURE FOR BACTERIA: CPT | Performed by: EMERGENCY MEDICINE

## 2022-12-07 PROCEDURE — 85379 FIBRIN DEGRADATION QUANT: CPT | Performed by: EMERGENCY MEDICINE

## 2022-12-07 PROCEDURE — 85610 PROTHROMBIN TIME: CPT | Performed by: EMERGENCY MEDICINE

## 2022-12-07 PROCEDURE — 93005 ELECTROCARDIOGRAM TRACING: CPT

## 2022-12-07 RX ORDER — ONDANSETRON 2 MG/ML
4 INJECTION INTRAMUSCULAR; INTRAVENOUS EVERY 6 HOURS PRN
Status: DISCONTINUED | OUTPATIENT
Start: 2022-12-07 | End: 2022-12-09 | Stop reason: HOSPADM

## 2022-12-07 RX ORDER — HEPARIN SODIUM 10000 [USP'U]/100ML
15.7 INJECTION, SOLUTION INTRAVENOUS
Status: DISCONTINUED | OUTPATIENT
Start: 2022-12-07 | End: 2022-12-09 | Stop reason: HOSPADM

## 2022-12-07 RX ORDER — SODIUM CHLORIDE 9 MG/ML
100 INJECTION, SOLUTION INTRAVENOUS CONTINUOUS
Status: DISCONTINUED | OUTPATIENT
Start: 2022-12-07 | End: 2022-12-09 | Stop reason: HOSPADM

## 2022-12-07 RX ORDER — ACETAMINOPHEN 325 MG/1
650 TABLET ORAL EVERY 4 HOURS PRN
Status: DISCONTINUED | OUTPATIENT
Start: 2022-12-07 | End: 2022-12-09 | Stop reason: HOSPADM

## 2022-12-07 RX ORDER — DOXYCYCLINE 100 MG/1
100 TABLET ORAL EVERY 12 HOURS SCHEDULED
Status: DISCONTINUED | OUTPATIENT
Start: 2022-12-08 | End: 2022-12-09 | Stop reason: HOSPADM

## 2022-12-07 RX ORDER — NICOTINE 21 MG/24HR
1 PATCH, TRANSDERMAL 24 HOURS TRANSDERMAL
Status: DISCONTINUED | OUTPATIENT
Start: 2022-12-08 | End: 2022-12-09 | Stop reason: HOSPADM

## 2022-12-07 RX ORDER — ONDANSETRON 2 MG/ML
4 INJECTION INTRAMUSCULAR; INTRAVENOUS ONCE
Status: COMPLETED | OUTPATIENT
Start: 2022-12-07 | End: 2022-12-07

## 2022-12-07 RX ORDER — SODIUM CHLORIDE 0.9 % (FLUSH) 0.9 %
10 SYRINGE (ML) INJECTION AS NEEDED
Status: DISCONTINUED | OUTPATIENT
Start: 2022-12-07 | End: 2022-12-09 | Stop reason: HOSPADM

## 2022-12-07 RX ORDER — SODIUM CHLORIDE 9 MG/ML
40 INJECTION, SOLUTION INTRAVENOUS AS NEEDED
Status: DISCONTINUED | OUTPATIENT
Start: 2022-12-07 | End: 2022-12-09 | Stop reason: HOSPADM

## 2022-12-07 RX ORDER — SODIUM CHLORIDE 0.9 % (FLUSH) 0.9 %
10 SYRINGE (ML) INJECTION EVERY 12 HOURS SCHEDULED
Status: DISCONTINUED | OUTPATIENT
Start: 2022-12-07 | End: 2022-12-09 | Stop reason: HOSPADM

## 2022-12-07 RX ADMIN — DOXYCYCLINE 100 MG: 100 INJECTION, POWDER, LYOPHILIZED, FOR SOLUTION INTRAVENOUS at 21:32

## 2022-12-07 RX ADMIN — SODIUM CHLORIDE 2859 ML: 9 INJECTION, SOLUTION INTRAVENOUS at 20:27

## 2022-12-07 RX ADMIN — IOPAMIDOL 100 ML: 755 INJECTION, SOLUTION INTRAVENOUS at 21:49

## 2022-12-07 RX ADMIN — ONDANSETRON 4 MG: 2 INJECTION INTRAMUSCULAR; INTRAVENOUS at 21:50

## 2022-12-07 RX ADMIN — HEPARIN SODIUM 15.7 UNITS/KG/HR: 10000 INJECTION, SOLUTION INTRAVENOUS at 23:10

## 2022-12-07 RX ADMIN — CEFEPIME 2 G: 2 INJECTION, POWDER, FOR SOLUTION INTRAVENOUS at 20:27

## 2022-12-07 RX ADMIN — HYDROMORPHONE HYDROCHLORIDE 1 MG: 1 INJECTION, SOLUTION INTRAMUSCULAR; INTRAVENOUS; SUBCUTANEOUS at 21:51

## 2022-12-08 ENCOUNTER — APPOINTMENT (OUTPATIENT)
Dept: CARDIOLOGY | Facility: HOSPITAL | Age: 57
End: 2022-12-08

## 2022-12-08 LAB
ANION GAP SERPL CALCULATED.3IONS-SCNC: 14 MMOL/L (ref 5–15)
APTT PPP: 65 SECONDS (ref 61–76.5)
APTT PPP: 77.7 SECONDS (ref 61–76.5)
BH CV ECHO MEAS - AO MAX PG: 9.8 MMHG
BH CV ECHO MEAS - AO MEAN PG: 5.5 MMHG
BH CV ECHO MEAS - AO ROOT DIAM: 3.9 CM
BH CV ECHO MEAS - AO V2 MAX: 156.5 CM/SEC
BH CV ECHO MEAS - AO V2 VTI: 25.4 CM
BH CV ECHO MEAS - AVA(I,D): 4 CM2
BH CV ECHO MEAS - EDV(CUBED): 112.8 ML
BH CV ECHO MEAS - EDV(MOD-SP4): 118.9 ML
BH CV ECHO MEAS - EF(MOD-BP): 45 %
BH CV ECHO MEAS - EF(MOD-SP4): 44.5 %
BH CV ECHO MEAS - ESV(CUBED): 44.5 ML
BH CV ECHO MEAS - ESV(MOD-SP4): 65.9 ML
BH CV ECHO MEAS - FS: 26.6 %
BH CV ECHO MEAS - IVS/LVPW: 1.16 CM
BH CV ECHO MEAS - IVSD: 0.98 CM
BH CV ECHO MEAS - LA A2CS (ATRIAL LENGTH): 2.8 CM
BH CV ECHO MEAS - LV DIASTOLIC VOL/BSA (35-75): 53.6 CM2
BH CV ECHO MEAS - LV MASS(C)D: 152.5 GRAMS
BH CV ECHO MEAS - LV MAX PG: 7.8 MMHG
BH CV ECHO MEAS - LV MEAN PG: 3.5 MMHG
BH CV ECHO MEAS - LV SYSTOLIC VOL/BSA (12-30): 29.7 CM2
BH CV ECHO MEAS - LV V1 MAX: 139.4 CM/SEC
BH CV ECHO MEAS - LV V1 VTI: 25.7 CM
BH CV ECHO MEAS - LVIDD: 4.8 CM
BH CV ECHO MEAS - LVIDS: 3.5 CM
BH CV ECHO MEAS - LVOT AREA: 3.9 CM2
BH CV ECHO MEAS - LVOT DIAM: 2.24 CM
BH CV ECHO MEAS - LVPWD: 0.85 CM
BH CV ECHO MEAS - MV A MAX VEL: 99.7 CM/SEC
BH CV ECHO MEAS - MV DEC SLOPE: 419.3 CM/SEC2
BH CV ECHO MEAS - MV DEC TIME: 0.22 MSEC
BH CV ECHO MEAS - MV E MAX VEL: 92.6 CM/SEC
BH CV ECHO MEAS - MV E/A: 0.93
BH CV ECHO MEAS - MV MAX PG: 4 MMHG
BH CV ECHO MEAS - MV MEAN PG: 2.5 MMHG
BH CV ECHO MEAS - MV V2 VTI: 22.4 CM
BH CV ECHO MEAS - MVA(VTI): 4.5 CM2
BH CV ECHO MEAS - PA V2 MAX: 121.5 CM/SEC
BH CV ECHO MEAS - QP/QS: 0.44
BH CV ECHO MEAS - RV MAX PG: 2.35 MMHG
BH CV ECHO MEAS - RV V1 MAX: 76.6 CM/SEC
BH CV ECHO MEAS - RV V1 VTI: 17.5 CM
BH CV ECHO MEAS - RVDD: 2.6 CM
BH CV ECHO MEAS - RVOT DIAM: 1.8 CM
BH CV ECHO MEAS - SI(MOD-SP4): 23.9 ML/M2
BH CV ECHO MEAS - SV(LVOT): 101 ML
BH CV ECHO MEAS - SV(MOD-SP4): 52.9 ML
BH CV ECHO MEAS - SV(RVOT): 44.3 ML
BH CV ECHO MEAS - TR MAX PG: 16 MMHG
BH CV ECHO MEAS - TR MAX VEL: 200.2 CM/SEC
BUN SERPL-MCNC: 31 MG/DL (ref 6–20)
BUN/CREAT SERPL: 26.7 (ref 7–25)
CALCIUM SPEC-SCNC: 8.3 MG/DL (ref 8.6–10.5)
CHLORIDE SERPL-SCNC: 95 MMOL/L (ref 98–107)
CK SERPL-CCNC: 121 U/L (ref 20–200)
CO2 SERPL-SCNC: 21 MMOL/L (ref 22–29)
CREAT SERPL-MCNC: 1.16 MG/DL (ref 0.76–1.27)
D-LACTATE SERPL-SCNC: 3.3 MMOL/L (ref 0.5–2)
DEPRECATED RDW RBC AUTO: 42.9 FL (ref 37–54)
EGFRCR SERPLBLD CKD-EPI 2021: 73 ML/MIN/1.73
EOSINOPHIL # BLD MANUAL: 0.27 10*3/MM3 (ref 0–0.4)
EOSINOPHIL NFR BLD MANUAL: 2 % (ref 0.3–6.2)
ERYTHROCYTE [DISTWIDTH] IN BLOOD BY AUTOMATED COUNT: 13.2 % (ref 12.3–15.4)
GLUCOSE SERPL-MCNC: 136 MG/DL (ref 65–99)
HCT VFR BLD AUTO: 45.2 % (ref 37.5–51)
HGB BLD-MCNC: 14.7 G/DL (ref 13–17.7)
LARGE PLATELETS: ABNORMAL
LYMPHOCYTES # BLD MANUAL: 1.08 10*3/MM3 (ref 0.7–3.1)
MAXIMAL PREDICTED HEART RATE: 162 BPM
MCH RBC QN AUTO: 31 PG (ref 26.6–33)
MCHC RBC AUTO-ENTMCNC: 32.6 G/DL (ref 31.5–35.7)
MCV RBC AUTO: 95.1 FL (ref 79–97)
METAMYELOCYTES NFR BLD MANUAL: 14 % (ref 0–0)
MRSA DNA SPEC QL NAA+PROBE: NORMAL
NEUTROPHILS # BLD AUTO: 10.26 10*3/MM3 (ref 1.7–7)
NEUTROPHILS NFR BLD MANUAL: 24 % (ref 42.7–76)
NEUTS BAND NFR BLD MANUAL: 52 % (ref 0–5)
NT-PROBNP SERPL-MCNC: 1203 PG/ML (ref 0–900)
PATHOLOGY REVIEW: YES
PLATELET # BLD AUTO: 178 10*3/MM3 (ref 140–450)
PMV BLD AUTO: 7.7 FL (ref 6–12)
POTASSIUM SERPL-SCNC: 4.2 MMOL/L (ref 3.5–5.2)
RBC # BLD AUTO: 4.75 10*6/MM3 (ref 4.14–5.8)
RBC MORPH BLD: NORMAL
SCAN SLIDE: NORMAL
SMALL PLATELETS BLD QL SMEAR: ADEQUATE
SODIUM SERPL-SCNC: 130 MMOL/L (ref 136–145)
STRESS TARGET HR: 138 BPM
TROPONIN T SERPL-MCNC: <0.01 NG/ML (ref 0–0.03)
TROPONIN T SERPL-MCNC: <0.01 NG/ML (ref 0–0.03)
VARIANT LYMPHS NFR BLD MANUAL: 8 % (ref 19.6–45.3)
WBC MORPH BLD: NORMAL
WBC NRBC COR # BLD: 13.5 10*3/MM3 (ref 3.4–10.8)

## 2022-12-08 PROCEDURE — 85730 THROMBOPLASTIN TIME PARTIAL: CPT | Performed by: INTERNAL MEDICINE

## 2022-12-08 PROCEDURE — 85730 THROMBOPLASTIN TIME PARTIAL: CPT | Performed by: EMERGENCY MEDICINE

## 2022-12-08 PROCEDURE — 93306 TTE W/DOPPLER COMPLETE: CPT

## 2022-12-08 PROCEDURE — 36415 COLL VENOUS BLD VENIPUNCTURE: CPT | Performed by: INTERNAL MEDICINE

## 2022-12-08 PROCEDURE — 85007 BL SMEAR W/DIFF WBC COUNT: CPT | Performed by: EMERGENCY MEDICINE

## 2022-12-08 PROCEDURE — 99222 1ST HOSP IP/OBS MODERATE 55: CPT | Performed by: INTERNAL MEDICINE

## 2022-12-08 PROCEDURE — 85025 COMPLETE CBC W/AUTO DIFF WBC: CPT | Performed by: EMERGENCY MEDICINE

## 2022-12-08 PROCEDURE — 84484 ASSAY OF TROPONIN QUANT: CPT | Performed by: INTERNAL MEDICINE

## 2022-12-08 PROCEDURE — 93306 TTE W/DOPPLER COMPLETE: CPT | Performed by: INTERNAL MEDICINE

## 2022-12-08 PROCEDURE — 87641 MR-STAPH DNA AMP PROBE: CPT | Performed by: INTERNAL MEDICINE

## 2022-12-08 PROCEDURE — 25010000002 HEPARIN (PORCINE) 25000-0.45 UT/250ML-% SOLUTION: Performed by: EMERGENCY MEDICINE

## 2022-12-08 PROCEDURE — 36415 COLL VENOUS BLD VENIPUNCTURE: CPT | Performed by: EMERGENCY MEDICINE

## 2022-12-08 PROCEDURE — 85007 BL SMEAR W/DIFF WBC COUNT: CPT | Performed by: INTERNAL MEDICINE

## 2022-12-08 PROCEDURE — 82550 ASSAY OF CK (CPK): CPT | Performed by: INTERNAL MEDICINE

## 2022-12-08 PROCEDURE — 80048 BASIC METABOLIC PNL TOTAL CA: CPT | Performed by: INTERNAL MEDICINE

## 2022-12-08 PROCEDURE — 85025 COMPLETE CBC W/AUTO DIFF WBC: CPT | Performed by: INTERNAL MEDICINE

## 2022-12-08 PROCEDURE — 83605 ASSAY OF LACTIC ACID: CPT

## 2022-12-08 PROCEDURE — 25010000002 CEFTRIAXONE PER 250 MG: Performed by: INTERNAL MEDICINE

## 2022-12-08 RX ORDER — GUAIFENESIN 600 MG/1
600 TABLET, EXTENDED RELEASE ORAL EVERY 12 HOURS SCHEDULED
Status: DISCONTINUED | OUTPATIENT
Start: 2022-12-08 | End: 2022-12-09 | Stop reason: HOSPADM

## 2022-12-08 RX ORDER — IPRATROPIUM BROMIDE AND ALBUTEROL SULFATE 2.5; .5 MG/3ML; MG/3ML
3 SOLUTION RESPIRATORY (INHALATION) EVERY 4 HOURS PRN
Status: DISCONTINUED | OUTPATIENT
Start: 2022-12-08 | End: 2022-12-09 | Stop reason: HOSPADM

## 2022-12-08 RX ORDER — MORPHINE SULFATE 2 MG/ML
2 INJECTION, SOLUTION INTRAMUSCULAR; INTRAVENOUS EVERY 4 HOURS PRN
Status: DISCONTINUED | OUTPATIENT
Start: 2022-12-08 | End: 2022-12-09 | Stop reason: HOSPADM

## 2022-12-08 RX ORDER — FAMOTIDINE 20 MG/1
20 TABLET, FILM COATED ORAL
Status: DISCONTINUED | OUTPATIENT
Start: 2022-12-08 | End: 2022-12-09 | Stop reason: HOSPADM

## 2022-12-08 RX ORDER — HYDROCODONE BITARTRATE AND ACETAMINOPHEN 7.5; 325 MG/1; MG/1
1 TABLET ORAL EVERY 6 HOURS PRN
Status: DISCONTINUED | OUTPATIENT
Start: 2022-12-08 | End: 2022-12-09 | Stop reason: HOSPADM

## 2022-12-08 RX ADMIN — HYDROCODONE BITARTRATE AND ACETAMINOPHEN 1 TABLET: 7.5; 325 TABLET ORAL at 21:50

## 2022-12-08 RX ADMIN — FAMOTIDINE 20 MG: 20 TABLET, FILM COATED ORAL at 18:14

## 2022-12-08 RX ADMIN — DOXYCYCLINE 100 MG: 100 TABLET ORAL at 21:50

## 2022-12-08 RX ADMIN — FAMOTIDINE 20 MG: 20 TABLET, FILM COATED ORAL at 08:40

## 2022-12-08 RX ADMIN — GUAIFENESIN 600 MG: 600 TABLET, EXTENDED RELEASE ORAL at 09:51

## 2022-12-08 RX ADMIN — Medication 10 ML: at 00:53

## 2022-12-08 RX ADMIN — DOXYCYCLINE 100 MG: 100 TABLET ORAL at 09:51

## 2022-12-08 RX ADMIN — NICOTINE 1 PATCH: 21 PATCH, EXTENDED RELEASE TRANSDERMAL at 08:41

## 2022-12-08 RX ADMIN — Medication 10 ML: at 08:40

## 2022-12-08 RX ADMIN — CEFTRIAXONE 1 G: 1 INJECTION, POWDER, FOR SOLUTION INTRAMUSCULAR; INTRAVENOUS at 05:23

## 2022-12-08 RX ADMIN — SODIUM CHLORIDE 100 ML/HR: 9 INJECTION, SOLUTION INTRAVENOUS at 22:23

## 2022-12-08 RX ADMIN — GUAIFENESIN 600 MG: 600 TABLET, EXTENDED RELEASE ORAL at 21:51

## 2022-12-08 RX ADMIN — GUAIFENESIN 600 MG: 600 TABLET, EXTENDED RELEASE ORAL at 00:58

## 2022-12-08 RX ADMIN — SODIUM CHLORIDE 100 ML/HR: 9 INJECTION, SOLUTION INTRAVENOUS at 00:55

## 2022-12-08 RX ADMIN — HYDROCODONE BITARTRATE AND ACETAMINOPHEN 1 TABLET: 7.5; 325 TABLET ORAL at 12:02

## 2022-12-08 RX ADMIN — Medication 10 ML: at 22:00

## 2022-12-08 RX ADMIN — HEPARIN SODIUM 13.1 UNITS/KG/HR: 10000 INJECTION, SOLUTION INTRAVENOUS at 11:07

## 2022-12-08 NOTE — H&P
[facility] Medicine Services      Patient Name: Neftali Rodrigues  : 1965  MRN: [id number]  Primary Care Physician:  Provider, No Known  Date of admission: 2022      Subjective      Chief Complaint: Shortness of air, fever, chest pain    History of Present Illness: Neftali Rodrigues is a 57 y.o. male who presented to [facility] on 2022 complaining of above.  He described developing fevers and chills about 3 days ago and then subsequently having severe 10 out of 10 sharp left-sided chest discomfort over the next few days.  He noted profound weakness and had endorsed pleurisy.  States that when he breathes through his nose he is able to control the chest pain though when he takes deep breaths the pain is severe.  He denies any known history of venothromboembolism.He denies any calf pain or swelling he denies any history of COPD though chart notes this diagnosis.  He is not chronically on any supplemental oxygen and does not take any inhalers at home.  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  He denies any known sick contacts.  He describes occasional some loose stools.  He also has a mild headache today.  No neck pain no photophobia.    Personal History     Past Medical History:   Diagnosis Date   • GERD (gastroesophageal reflux disease)    • Hepatitis C virus infection without hepatic coma 2019   • History of substance use disorder 2019   • Other emphysema (CMS/HCC) 2019   • Tobacco dependence 2019       Past Surgical History:   Procedure Laterality Date   • CYST REMOVAL      in left groin (I & D)   • EYE SURGERY  2019    Laser - bilateral       Family History: family history includes Alcohol abuse in his father; Arthritis in his paternal aunt; Coronary artery disease in his brother and father; Diabetes in his cousin and father; Heart attack in his  "paternal uncle; Hypertension in his brother, cousin, and father; Lung cancer in his mother; Migraines in his mother. Otherwise pertinent FHx was reviewed and not pertinent to current issue.    Social History:  reports that he has been smoking. He started smoking about 43 years ago. He has a 30.00 pack-year smoking history. He has never used smokeless tobacco. He reports current drug use. Drugs: Benzodiazepines, Amphetamines, Heroin, Hydrocodone, Marijuana, Methamphetamines, Cocaine(coke), \"Crack\" cocaine, and LSD. He reports that he does not drink alcohol.    Home Medications:  Prior to Admission Medications     Prescriptions Last Dose Informant Patient Reported? Taking?    varenicline (CHANTIX CONTINUING MONTH PAK) 1 MG tablet   No No    Take 1 tablet by mouth 2 (Two) Times a Day.            Allergies:  No Known Allergies    Objective      Vitals:   Temp:  [98.9 °F (37.2 °C)] 98.9 °F (37.2 °C)  Heart Rate:  [114-126] 114  Resp:  [22] 22  BP: (147)/(129) 147/129    Physical Exam   Physical Exam:     GEN/CONS:   Vitals noted above, NAD, AOX3, saturating well on room air, well-built white male   EYES:  Conjunctiva pink   ENMT:  NECK:  Atraumatic external nose/ears, Oropharynx clear  Symmetric, trachea midline   CV:  Regular, Reg S1/S2, no murmur   PULM:  Diminished breath sounds bilaterally likely secondary to inspiratory effort, no wheezes   GI:  : Soft, Nontender, Nondistended   Deferred.    MSK:  No cyanosis, No LE edema   NEURO:    Sensation and motor symmetric and grossly intact and nonfocal--moving 4 extremities.   PSYCH:   Appropriate mood and affect         Result Review    Result Review:  I have personally reviewed the results from the time of this admission to 12/7/2022 23:57 EST and agree with these findings:  [x]  Laboratory  []  Microbiology  [x]  Radiology  [x]  EKG/Telemetry   [x]  Cardiology/Vascular   []  Pathology  [x]  Old records  []  Other:  Most notable findings include:    LABS      Lab " 12/07/22 2007 12/07/22 2003   WBC  --  13.90*   HEMOGLOBIN  --  16.2   HEMATOCRIT  --  46.9   PLATELETS  --  192   NEUTROS ABS  --  11.82*   MCV  --  93.6   LACTATE 4.0*  --    PROTIME  --  13.8*   APTT  --  42.4*         Lab 12/07/22 2003   SODIUM 133*   POTASSIUM 4.5   CHLORIDE 94*   CO2 20.0*   ANION GAP 19.0*   BUN 38*   CREATININE 1.62*   EGFR 48.9*   GLUCOSE 137*   CALCIUM 9.4         Lab 12/07/22 2003   TOTAL PROTEIN 6.2   ALBUMIN 3.30*   GLOBULIN 2.9   ALT (SGPT) 28   AST (SGOT) 28   BILIRUBIN 0.7   ALK PHOS 55         Lab 12/07/22 2003   TROPONIN T <0.010   INR 1.36*                 Brief Urine Lab Results     None        ________________________________  MICRO  Microbiology Results (last 10 days)     Procedure Component Value - Date/Time    Respiratory Panel PCR w/COVID-19(SARS-CoV-2) [facility]E In-House, NP Swab in UTM/VTM, 3-4 HR TAT - Swab, Nasopharynx [[id number]]  (Abnormal) Collected: 12/07/22 2004    Lab Status: Final result Specimen: Swab from Nasopharynx Updated: 12/07/22 2126     ADENOVIRUS, PCR Not Detected     Coronavirus 229E Not Detected     Coronavirus HKU1 Not Detected     Coronavirus NL63 Not Detected     Coronavirus OC43 Not Detected     COVID19 Not Detected     Human Metapneumovirus Not Detected     Human Rhinovirus/Enterovirus Not Detected     Influenza A PCR Not Detected     Influenza B PCR Not Detected     Parainfluenza Virus 1 Not Detected     Parainfluenza Virus 2 Not Detected     Parainfluenza Virus 3 Not Detected     Parainfluenza Virus 4 Not Detected     RSV, PCR Detected     Bordetella pertussis pcr Not Detected     Bordetella parapertussis PCR Not Detected     Chlamydophila pneumoniae PCR Not Detected     Mycoplasma pneumo by PCR Not Detected    Narrative:      In the setting of a positive respiratory panel with a viral infection PLUS a negative procalcitonin without other underlying concern for bacterial infection, consider observing off antibiotics or  discontinuation of antibiotics and continue supportive care. If the respiratory panel is positive for atypical bacterial infection (Bordetella pertussis, Chlamydophila pneumoniae, or Mycoplasma pneumoniae), consider antibiotic de-escalation to target atypical bacterial infection.        ________________________________  RAD  XR Chest 1 View    Result Date: 12/7/2022  Impression: 1.     Airspace opacities and consolidation in the left mid and lower lung with suspicion for a small left effusion. 2.     Findings are suspicious for pneumonia given patient's history, but follow-up is recommended to ensure resolution.  Electronically Signed By-Sulaiman Espinoza MD On:12/7/2022 8:44 PM This report was finalized on [id number] by  Sulaiman Espinoza MD.    CT Angiogram Chest Pulmonary Embolism    Result Date: 12/7/2022  Impression: 1.     Suspected pulmonary embolism in the left upper lobe. 2.     Dense consolidation in the left upper lobe and lingula and additional airspace opacities and consolidation in the left lower lobe with trace pleural effusion. Findings are most suspicious for pneumonia, but follow-up is recommended to ensure resolution. 3.     Mild emphysema. 4.     Mediastinal and hilar lymphadenopathy, which may be reactive, but follow-up is again recommended. 5.     Mild reflux of contrast into the IVC and hepatic veins which could be seen with right heart failure. No definite septal deviation or pulmonary artery enlargement is seen at this time.  Electronically Signed By-Sulaiman Espinoza MD On:12/7/2022 10:25 PM This report was finalized on [id number] by  Sulaiman Espinoza MD.    ________________________________  CARDS     No results found for this or any previous visit.        Assessment & Plan        Active Hospital Problems:  Active Hospital Problems    Diagnosis    • **Sepsis, due to unspecified organism, unspecified whether acute organ dysfunction present (HCC)        Assessment:    Acute Left Upper Lobe Pulmonary  Embolus-Suspected  • Admission: Troponin within normal limits, saturating well on room air, not hypotensive/mildly tachycardic  • Imaging: CT PE protocol noted above.  See report for full details  • ECG: Personally reviewed.  12/7/2022 sinus tachycardia.  Heart rate 123.  Incomplete right bundle branch block.  Nonspecific ST-T wave findings.  No prior for comparison locally  Acute left upper lobe pneumonia  • Admission: Afebrile, WBC 13.9, lactate 4.0,  • Imaging: CT chest noted above.  See report for details  • Misc/Context: Possibly secondary bacterial infection from PE.  Acute RSV viral infection  • Admission: Respiratory viral panel positive for RSV  Sepsis-secondary to above  · Admission: See lab values as above  History of hepatitis C  • Admission: LFTs within normal limits  · Has been treated per pt  Tobacco use/nicotine dependence  · 1 PPD  Former substance abuse  · Endorses previous use of multiple agents  Overweight  · Body mass index is 26.99 kg/m².  Full code  · Discussed and verified    Discussion/Plan:   Admit to stepdown unit inpatient status  Heparin gtt at this time and consider transition to oral anticoagulant perhaps next 24 hours if remains stable.  Obtain echocardiogram in the morning.  Appears to be hemodynamically stable at this time and saturating well on room air.  He denies any known history of COPD and is not chronically on supplemental oxygen does not take any inhalers at home  Trend troponin.  Telemetry monitoring  Empiric antibiotics.  Follow blood cultures to guide therapy.  Check MRSA nasal swab  Monitor renal function with IV fluid hydration. Consider renal imaging if persistent renal dysfunction noted. Strict I/O  Trend lactate  JODY/OSCAR prn  Mucinex BID  Tobacco use cessation advised and counseling provided. Nicotine patch.   May consider pulmonary consultation in AM if necessary  H2B  Surveillance labs.  See orders for additional details.  Pharmacy to confirm home medications and  will resume/adjust appropriate medications if necessary once confirmed and notified as ordered.  Further recommendations to follow as inpatient course and evaluation proceeds.   Daytime hospitalist provider will continue forward care in the morning.       DVT prophylaxis:  Medical DVT prophylaxis orders are present.    CODE STATUS:       Admission Status:  I believe this patient meets inpatient status.    I discussed the patient's findings and my recommendations with patient.    This patient has been examined wearing appropriate Personal Protective Equipment     Signature: Altaf Griffin MD

## 2022-12-08 NOTE — CONSULTS
Hematology/Oncology Inpatient Consultation    Patient name: Neftali Rodrigues  : 1965  MRN: [id number]  Referring Provider: Dr. Schmitz  Reason for Consultation: Pulmonary embolism    Chief complaint: Chest pain, shortness of breath, fever    History of present illness:    Neftlai Rodrigues is a 57 y.o. male who presented to [facility] on 2022 with complaints of chest pain, shortness of breath, fever and chills.  Past medical history included GERD, substance abuse disorder, emphysema, tobacco dependence, hepatitis C infection. Patient reported developing fever and chills 3 days prior to presentation followed by severe 10 out of 10 sharp left-sided chest discomfort, profound weakness, and pleurisy.  He also noted occasional loose stools and a mild headache.  He stated that the chest pain was more severe with deep breaths.  He denied any known history of thromboembolism.  He denied any calf pain or swelling.  He denied any known sick contacts.  He reported being fairly active and doing manual activity routinely.    Evaluation in the ED: Negative troponin and cardiac enzymes.  Lactate elevated at 4.0, proBNP elevated at 1203, D-dimer elevated at 0.89.  PT 13.8, INR 1.36, PTT 42.4.  WBC 13.90, hemoglobin 16.2, MCV 93.6, platelets 192,000, bands 39%.  Respiratory panel positive for RSV.  CT of the chest PE protocol with suspicion for a nonocclusive thrombus in the left upper lobe, left upper lobe, lingula, and left lower lobe consolidation suspicious for pneumonia, mediastinal and hilar lymphadenopathy AP window lymph node measuring approximately 11 x 15 mm.  Follow-up recommended.  Mild reflux of contrast into the IVC and hepatic veins which could be seen with right heart failure.  No definite septal deviation or pulmonary artery enlargement seen.  Patient was initiated on heparin drip.    22  Hematology/Oncology was consulted for pulmonary embolism.    He/She  has a past medical  "history of GERD (gastroesophageal reflux disease), Hepatitis C virus infection without hepatic coma (8/22/2019), History of substance use disorder (8/22/2019), Other emphysema (CMS/HCC) (9/4/2019), and Tobacco dependence (8/22/2019).    PCP: Provider, No Known    History:  Past Medical History:   Diagnosis Date   • GERD (gastroesophageal reflux disease)    • Hepatitis C virus infection without hepatic coma 8/22/2019   • History of substance use disorder 8/22/2019   • Other emphysema (CMS/HCC) 9/4/2019   • Tobacco dependence 8/22/2019   ,   Past Surgical History:   Procedure Laterality Date   • CYST REMOVAL  2012    in left groin (I & D)   • EYE SURGERY  05/2019    Laser - bilateral   ,   Family History   Problem Relation Age of Onset   • Migraines Mother    • Lung cancer Mother    • Hypertension Father    • Alcohol abuse Father    • Diabetes Father    • Coronary artery disease Father    • Hypertension Brother    • Coronary artery disease Brother    • Hypertension Cousin    • Diabetes Cousin    • Arthritis Paternal Aunt    • Heart attack Paternal Uncle    ,   Social History     Tobacco Use   • Smoking status: Every Day     Packs/day: 0.75     Years: 40.00     Pack years: 30.00     Types: Cigarettes     Start date: 8/22/1979   • Smokeless tobacco: Never   Substance Use Topics   • Alcohol use: No     Comment: Former 2015   • Drug use: Yes     Types: Benzodiazepines, Amphetamines, Heroin, Hydrocodone, Marijuana, Methamphetamines, Cocaine(coke), \"Crack\" cocaine, LSD     Comment: Former 2015, multiple   , (Not in a hospital admission)  , Scheduled Meds:  cefTRIAXone, 1 g, Intravenous, Q24H  doxycycline, 100 mg, Oral, Q12H  famotidine, 20 mg, Oral, BID AC  guaiFENesin, 600 mg, Oral, Q12H  nicotine, 1 patch, Transdermal, Q24H  sodium chloride, 10 mL, Intravenous, Q12H    , Continuous Infusions:  heparin, 15.7 Units/kg/hr, Last Rate: 13.1 Units/kg/hr (12/08/22 1107)  sodium chloride, 100 mL/hr, Last Rate: 100 mL/hr (12/08/22 " "0310)    , PRN Meds:  •  acetaminophen  •  heparin  •  heparin  •  HYDROcodone-acetaminophen  •  ipratropium-albuterol  •  Morphine  •  ondansetron  •  [COMPLETED] Insert Peripheral IV **AND** sodium chloride  •  sodium chloride  •  sodium chloride   Allergies:  Patient has no known allergies.    Subjective     ROS:  Review of Systems     Objective   Vital Signs:   /64 (BP Location: Right arm, Patient Position: Lying)   Pulse 103   Temp 98.1 °F (36.7 °C) (Oral)   Resp 25   Ht 188 cm (74\")   Wt 95.3 kg (210 lb)   SpO2 94%   BMI 26.96 kg/m²     Physical Exam: (performed by MD)  Physical Exam    Results Review:  Lab Results (last 48 hours)     Procedure Component Value Units Date/Time    Troponin [[id number]]  (Normal) Collected: 12/08/22 0511    Specimen: Blood from Arm, Left Updated: 12/08/22 0547     Troponin T <0.010 ng/mL     Narrative:      Troponin T Reference Range:  <= 0.03 ng/mL-   Negative for AMI  >0.03 ng/mL-     Abnormal for myocardial necrosis.  Clinicians would have to utilize clinical acumen, EKG, Troponin and serial changes to determine if it is an Acute Myocardial Infarction or myocardial injury due to an underlying chronic condition.       Results may be falsely decreased if patient taking Biotin.      Scan Slide [[id number]] Collected: 12/08/22 0427    Specimen: Blood from Arm, Left Updated: 12/08/22 0526     Scan Slide --     Comment: See Manual Differential Results       CBC Auto Differential [[id number]]  (Abnormal) Collected: 12/08/22 0427    Specimen: Blood from Arm, Left Updated: 12/08/22 0526     WBC 13.50 10*3/mm3      RBC 4.75 10*6/mm3      Hemoglobin 14.7 g/dL      Hematocrit 45.2 %      MCV 95.1 fL      MCH 31.0 pg      MCHC 32.6 g/dL      RDW 13.2 %      RDW-SD 42.9 fl      MPV 7.7 fL      Platelets 178 10*3/mm3     Narrative:      Modified report. Previous result was Hemogram on 12/8/2022 at 0444 EST.  The previously reported component NRBC is no longer being reported. " Previous result was 0.0 /100 WBC (Reference Range: 0.0-0.2 /100 WBC) on 12/8/2022 at 0444 EST.    Manual Differential [[id number]]  (Abnormal) Collected: 12/08/22 0427    Specimen: Blood from Arm, Left Updated: 12/08/22 0526     Neutrophil % 24.0 %      Lymphocyte % 8.0 %      Eosinophil % 2.0 %      Bands %  52.0 %      Metamyelocyte % 14.0 %      Neutrophils Absolute 10.26 10*3/mm3      Lymphocytes Absolute 1.08 10*3/mm3      Eosinophils Absolute 0.27 10*3/mm3      RBC Morphology Normal     WBC Morphology Normal     Platelet Estimate Adequate     Large Platelets Slight/1+    Path Consult Reflex [[id number]] Collected: 12/08/22 0427    Specimen: Blood from Arm, Left Updated: 12/08/22 0523     Pathology Review Yes    aPTT [[id number]]  (Abnormal) Collected: 12/08/22 0427    Specimen: Blood from Arm, Left Updated: 12/08/22 0458     PTT 77.7 seconds     Basic Metabolic Panel [[id number]]  (Abnormal) Collected: 12/08/22 0427    Specimen: Blood from Arm, Left Updated: 12/08/22 0454     Glucose 136 mg/dL      BUN 31 mg/dL      Creatinine 1.16 mg/dL      Sodium 130 mmol/L      Potassium 4.2 mmol/L      Comment: Slight hemolysis detected by analyzer. Results may be affected.        Chloride 95 mmol/L      CO2 21.0 mmol/L      Calcium 8.3 mg/dL      BUN/Creatinine Ratio 26.7     Anion Gap 14.0 mmol/L      eGFR 73.0 mL/min/1.73      Comment: National Kidney Foundation and American Society of Nephrology (ASN) Task Force recommended calculation based on the Chronic Kidney Disease Epidemiology Collaboration (CKD-EPI) equation refit without adjustment for race.       Narrative:      GFR Normal >60  Chronic Kidney Disease <60  Kidney Failure <15      MRSA Screen, PCR (Inpatient) - Swab, Nares [[id number]]  (Normal) Collected: 12/08/22 0057    Specimen: Swab from Nares Updated: 12/08/22 0224     MRSA PCR No MRSA Detected    Narrative:      The negative predictive value of this diagnostic test is high and should only be used to consider  de-escalating anti-MRSA therapy. A positive result may indicate colonization with MRSA and must be correlated clinically.    STAT Lactic Acid, Reflex [[id number]]  (Abnormal) Collected: 12/08/22 0051    Specimen: Blood Updated: 12/08/22 0125     Lactate 3.3 mmol/L     Troponin [[id number]]  (Normal) Collected: 12/08/22 0051    Specimen: Blood Updated: 12/08/22 0123     Troponin T <0.010 ng/mL     Narrative:      Troponin T Reference Range:  <= 0.03 ng/mL-   Negative for AMI  >0.03 ng/mL-     Abnormal for myocardial necrosis.  Clinicians would have to utilize clinical acumen, EKG, Troponin and serial changes to determine if it is an Acute Myocardial Infarction or myocardial injury due to an underlying chronic condition.       Results may be falsely decreased if patient taking Biotin.      CK [[id number]]  (Normal) Collected: 12/08/22 0051    Specimen: Blood Updated: 12/08/22 0123     Creatine Kinase 121 U/L     BNP [[id number]]  (Abnormal) Collected: 12/07/22 2003    Specimen: Blood Updated: 12/08/22 0036     proBNP 1,203.0 pg/mL     Narrative:      Among patients with dyspnea, NT-proBNP is highly sensitive for the detection of acute congestive heart failure. In addition NT-proBNP of <300 pg/ml effectively rules out acute congestive heart failure with 99% negative predictive value.      Respiratory Panel PCR w/COVID-19(SARS-CoV-2) [facility] In-House, NP Swab in [place], 3-4 HR TAT - Swab, Nasopharynx [[id number]]  (Abnormal) Collected: 12/07/22 2004    Specimen: Swab from Nasopharynx Updated: 12/07/22 2126     ADENOVIRUS, PCR Not Detected     Coronavirus 229E Not Detected     Coronavirus HKU1 Not Detected     Coronavirus NL63 Not Detected     Coronavirus OC43 Not Detected     COVID19 Not Detected     Human Metapneumovirus Not Detected     Human Rhinovirus/Enterovirus Not Detected     Influenza A PCR Not Detected     Influenza B PCR Not Detected     Parainfluenza Virus 1 Not Detected     Parainfluenza  Virus 2 Not Detected     Parainfluenza Virus 3 Not Detected     Parainfluenza Virus 4 Not Detected     RSV, PCR Detected     Bordetella pertussis pcr Not Detected     Bordetella parapertussis PCR Not Detected     Chlamydophila pneumoniae PCR Not Detected     Mycoplasma pneumo by PCR Not Detected    Narrative:      In the setting of a positive respiratory panel with a viral infection PLUS a negative procalcitonin without other underlying concern for bacterial infection, consider observing off antibiotics or discontinuation of antibiotics and continue supportive care. If the respiratory panel is positive for atypical bacterial infection (Bordetella pertussis, Chlamydophila pneumoniae, or Mycoplasma pneumoniae), consider antibiotic de-escalation to target atypical bacterial infection.    Manual Differential [[id number]]  (Abnormal) Collected: 12/07/22 2003    Specimen: Blood Updated: 12/07/22 2116     Neutrophil % 46.0 %      Lymphocyte % 5.0 %      Monocyte % 3.0 %      Bands %  39.0 %      Metamyelocyte % 7.0 %      Neutrophils Absolute 11.82 10*3/mm3      Lymphocytes Absolute 0.70 10*3/mm3      Monocytes Absolute 0.42 10*3/mm3      RBC Morphology Normal     WBC Morphology Normal     Platelet Morphology Normal    CBC & Differential [[id number]]  (Abnormal) Collected: 12/07/22 2003    Specimen: Blood Updated: 12/07/22 2116    Narrative:      The following orders were created for panel order CBC & Differential.  Procedure                               Abnormality         Status                     ---------                               -----------         ------                     CBC Auto Differential[[id number]]        Abnormal            Final result               Scan Slide[[id number]]                                       Final result                 Please view results for these tests on the individual orders.    CBC Auto Differential [[id number]]  (Abnormal) Collected: 12/07/22 2003    Specimen: Blood Updated:  "12/07/22 2116     WBC 13.90 10*3/mm3      RBC 5.00 10*6/mm3      Hemoglobin 16.2 g/dL      Hematocrit 46.9 %      MCV 93.6 fL      MCH 32.5 pg      MCHC 34.7 g/dL      RDW 13.4 %      RDW-SD 45.5 fl      MPV 7.8 fL      Platelets 192 10*3/mm3     Narrative:      Modified report. Previous result was Hemogram on 12/7/2022 at 2029 EST.  The previously reported component NRBC is no longer being reported. Previous result was 0.0 /100 WBC (Reference Range: 0.0-0.2 /100 WBC) on 12/7/2022 at 2029 EST.    Scan Slide [[id number]] Collected: 12/07/22 2003    Specimen: Blood Updated: 12/07/22 2116     Scan Slide --     Comment: See Manual Differential Results       Protime-INR [[id number]]  (Abnormal) Collected: 12/07/22 2003    Specimen: Blood Updated: 12/07/22 2039     Protime 13.8 Seconds      INR 1.36    aPTT [[id number]]  (Abnormal) Collected: 12/07/22 2003    Specimen: Blood Updated: 12/07/22 2039     PTT 42.4 seconds     D-dimer, Quantitative [[id number]]  (Abnormal) Collected: 12/07/22 2003    Specimen: Blood Updated: 12/07/22 2039     D-Dimer, Quantitative 0.89 mg/L (FEU)     Narrative:      According to the assay 's published package insert, a normal (<0.50 mg/L (FEU)) D-dimer result in conjunction with a non-high clinical probability assessment, excludes deep vein thrombosis (DVT) and pulmonary embolism (PE) with high sensitivity.    D-dimer values increase with age and this can make VTE exclusion of an older population difficult. To address this, the American College of Physicians, based on best available evidence and recent guidelines, recommends that clinicians use age-adjusted D-dimer thresholds in patients greater than 50 years of age with: a) a low probability of PE who do not meet all Pulmonary Embolism Rule Out Criteria, or b) in those with intermediate probability of PE.   The formula for an age-adjusted D-dimer cut-off is \"age/100\".  For example, a 60 year old patient would have an age-adjusted " cut-off of 0.60 mg/L (FEU) and an 80 year old 0.80 mg/L (FEU).    Comprehensive Metabolic Panel [[id number]]  (Abnormal) Collected: 12/07/22 2003    Specimen: Blood Updated: 12/07/22 2035     Glucose 137 mg/dL      BUN 38 mg/dL      Creatinine 1.62 mg/dL      Sodium 133 mmol/L      Potassium 4.5 mmol/L      Chloride 94 mmol/L      CO2 20.0 mmol/L      Calcium 9.4 mg/dL      Total Protein 6.2 g/dL      Albumin 3.30 g/dL      ALT (SGPT) 28 U/L      AST (SGOT) 28 U/L      Alkaline Phosphatase 55 U/L      Total Bilirubin 0.7 mg/dL      Globulin 2.9 gm/dL      A/G Ratio 1.1 g/dL      BUN/Creatinine Ratio 23.5     Anion Gap 19.0 mmol/L      eGFR 48.9 mL/min/1.73      Comment: National Kidney Foundation and American Society of Nephrology (ASN) Task Force recommended calculation based on the Chronic Kidney Disease Epidemiology Collaboration (CKD-EPI) equation refit without adjustment for race.       Narrative:      GFR Normal >60  Chronic Kidney Disease <60  Kidney Failure <15      Troponin [[id number]]  (Normal) Collected: 12/07/22 2003    Specimen: Blood Updated: 12/07/22 2035     Troponin T <0.010 ng/mL     Narrative:      Troponin T Reference Range:  <= 0.03 ng/mL-   Negative for AMI  >0.03 ng/mL-     Abnormal for myocardial necrosis.  Clinicians would have to utilize clinical acumen, EKG, Troponin and serial changes to determine if it is an Acute Myocardial Infarction or myocardial injury due to an underlying chronic condition.       Results may be falsely decreased if patient taking Biotin.      POC Lactate [[id number]]  (Abnormal) Collected: 12/07/22 2007    Specimen: Blood Updated: 12/07/22 2008     Lactate 4.0 mmol/L      Comment: Serial Number: [id number]:  [id number]       Blood Culture - Blood, Arm, Right [[id number]] Collected: 12/07/22 2004    Specimen: Blood from Arm, Right Updated: 12/07/22 2007    Blood Culture - Blood, Arm, Left [[id number]] Collected: 12/07/22 2004    Specimen: Blood from Arm, Left  Updated: 12/07/22 2007           Pending Results: Bilateral venous Doppler ultrasound of the lower extremities    Imaging Reviewed:   XR Chest 1 View    Result Date: 12/7/2022  1.     Airspace opacities and consolidation in the left mid and lower lung with suspicion for a small left effusion. 2.     Findings are suspicious for pneumonia given patient's history, but follow-up is recommended to ensure resolution.  Electronically Signed By-Sulaiman Espinoza MD On:12/7/2022 8:44 PM This report was finalized on [id number] by  Sulaiman Espinoza MD.    CT Angiogram Chest Pulmonary Embolism    Result Date: 12/7/2022  1.     Suspected pulmonary embolism in the left upper lobe. 2.     Dense consolidation in the left upper lobe and lingula and additional airspace opacities and consolidation in the left lower lobe with trace pleural effusion. Findings are most suspicious for pneumonia, but follow-up is recommended to ensure resolution. 3.     Mild emphysema. 4.     Mediastinal and hilar lymphadenopathy, which may be reactive, but follow-up is again recommended. 5.     Mild reflux of contrast into the IVC and hepatic veins which could be seen with right heart failure. No definite septal deviation or pulmonary artery enlargement is seen at this time.  Electronically Signed By-Sulaiman Espinoza MD On:12/7/2022 10:25 PM This report was finalized on [id number] by  Sulaiman Espinoza MD.           Assessment & Plan   ASSESSMENT  1. Acute left upper lobe pulmonary embolism: We will obtain bilateral venous Doppler ultrasound of the lower extremities.  Continue heparin drip and plan to transition to oral anticoagulation for discharge.  Plan for duration of 3 to 6 months.  2. Mediastinal and hilar lymphadenopathy: Possibly reactive, follow-up imaging recommended.  3. Acute left upper lobe pneumonia/RSV infection/sepsis: On empiric antibiotics.  4. SHANON: On IV fluid hydration.  5. Tobacco abuse: Encourage total cessation    PLAN  1. As  above    Electronically signed by Joellen Denton, MELANIE, 12/08/22, 9:18 AM EST.    I was asked to see Mr. Rodrigues, who came in the hospital with dyspnea, cough and left sided chest pain. It had started approximately 3 days before with intense chills and fevers. He had been spending more time in bed for at least 48 hours. He gave a history of intense cigarette smoking that had been going on for at least 40 years. At the time of the admission he had imaging of the chest a suspected pulmonary embolism as well as dense consolidation of the left upper lobe and lingula with additional air space opacities and consolidation of the left lower lobe. Trace pleural effusion was present. This was felt to be most consistent with pneumonia. Additionally suggestion of right heart failure was present, with reflux of the intravenous contrast into the inferior vena cava and the hepatic veins. On direct questioning, he reported no events close to the admission. He had been working full time in remodeling. He had been eating well and his weight had been stable. He denied any history of heart disease or of chronic lung disease. No history of digestive symptoms. On exam he appeared to be a chronically ill appearing man. He seemed older than the stated age but was not in distress. No jaundice. No oral lesions and respirations not labored. Frequent wet cough. On auscultation the lungs were diminished bilaterally and with crackles in both bases. The heart tachycardic but regular. The abdomen soft and not tender and no edema. Independently reviewed the images of the ct scan of the chest. Unclear whether indeed he has a pulmonary embolism but the present illness and the suggestion of embolism on the scan are concordant and he should be treated with anticoagulation at least for 3 months. He could be transitioned to an oral anticoagulant in the morning. Will keep on intravenous unfractionated heparin for now. He is to continue antibiotics.  Awaiting cultures. Reviewed all the records and reviewed the family history. All laboratory exams were reviewed. Discussed with him results.   I have discussed with Ms. Bertin HONG. I concur with her note.     Galdino Hunt MD on 12/8/2022 at 18:00

## 2022-12-08 NOTE — ED PROVIDER NOTES
Subjective   History of Present Illness  Chief complaint: Fever    57-year-old male presents with fever, cough, congestion, shortness of breath, chest pain.  Patient states symptoms started yesterday and have gotten progressively worse.  He has had headache.  He states his chest discomfort is worse with deep breaths or cough.  He denies any known sick contacts.  No known alleviating or exacerbating factors.  Symptoms described as moderate to severe.    History provided by:  Patient      Review of Systems   Constitutional: Positive for fever.   HENT: Positive for congestion.    Eyes: Negative for redness.   Respiratory: Positive for cough and shortness of breath.    Cardiovascular: Positive for chest pain.   Gastrointestinal: Positive for diarrhea. Negative for abdominal pain and vomiting.   Genitourinary: Negative for dysuria.   Musculoskeletal: Negative for back pain.   Skin: Negative for rash.   Neurological: Positive for headaches. Negative for dizziness.   Psychiatric/Behavioral: Negative for confusion.       Past Medical History:   Diagnosis Date   • GERD (gastroesophageal reflux disease)    • Hepatitis C virus infection without hepatic coma 8/22/2019   • History of substance use disorder 8/22/2019   • Other emphysema (CMS/HCC) 9/4/2019   • Tobacco dependence 8/22/2019       No Known Allergies    Past Surgical History:   Procedure Laterality Date   • CYST REMOVAL  2012    in left groin (I & D)   • EYE SURGERY  05/2019    Laser - bilateral       Family History   Problem Relation Age of Onset   • Migraines Mother    • Lung cancer Mother    • Hypertension Father    • Alcohol abuse Father    • Diabetes Father    • Coronary artery disease Father    • Hypertension Brother    • Coronary artery disease Brother    • Hypertension Cousin    • Diabetes Cousin    • Arthritis Paternal Aunt    • Heart attack Paternal Uncle        Social History     Socioeconomic History   • Marital status:    Tobacco Use   • Smoking  "status: Every Day     Packs/day: 0.75     Years: 40.00     Pack years: 30.00     Types: Cigarettes     Start date: 8/22/1979   • Smokeless tobacco: Never   Substance and Sexual Activity   • Alcohol use: No     Comment: Former 2015   • Drug use: Yes     Types: Benzodiazepines, Amphetamines, Heroin, Hydrocodone, Marijuana, Methamphetamines, Cocaine(coke), \"Crack\" cocaine, LSD     Comment: Former 2015, multiple       BP (!) 147/129 (BP Location: Left arm, Patient Position: Sitting)   Pulse 114   Temp 98.9 °F (37.2 °C) (Oral)   Resp 22   Ht 188 cm (74\")   Wt 95.3 kg (210 lb 3.2 oz)   SpO2 99%   BMI 26.99 kg/m² '      Objective   Physical Exam  Vitals and nursing note reviewed.   Constitutional:       Appearance: He is well-developed. He is ill-appearing.   HENT:      Head: Normocephalic and atraumatic.   Eyes:      Pupils: Pupils are equal, round, and reactive to light.   Cardiovascular:      Rate and Rhythm: Regular rhythm. Tachycardia present.      Heart sounds: Normal heart sounds.   Pulmonary:      Effort: Pulmonary effort is normal. Tachypnea present. No respiratory distress.      Breath sounds: Normal breath sounds.   Abdominal:      General: Bowel sounds are normal.      Palpations: Abdomen is soft.      Tenderness: There is no abdominal tenderness.   Skin:     General: Skin is warm and dry.   Neurological:      Mental Status: He is alert and oriented to person, place, and time.         Procedures           ED Course      My interpretation of EKG shows sinus tachycardia, rate of 123, no ST elevation     Results for orders placed or performed during the hospital encounter of 12/07/22   Respiratory Panel PCR w/COVID-19(SARS-CoV-2) [facility] In-House, NP Swab in UTM/VTM, 3-4 HR TAT - Swab, Nasopharynx    Specimen: Nasopharynx; Swab   Result Value Ref Range    ADENOVIRUS, PCR Not Detected Not Detected    Coronavirus 229E Not Detected Not Detected    Coronavirus HKU1 Not Detected Not Detected "    Coronavirus NL63 Not Detected Not Detected    Coronavirus OC43 Not Detected Not Detected    COVID19 Not Detected Not Detected - Ref. Range    Human Metapneumovirus Not Detected Not Detected    Human Rhinovirus/Enterovirus Not Detected Not Detected    Influenza A PCR Not Detected Not Detected    Influenza B PCR Not Detected Not Detected    Parainfluenza Virus 1 Not Detected Not Detected    Parainfluenza Virus 2 Not Detected Not Detected    Parainfluenza Virus 3 Not Detected Not Detected    Parainfluenza Virus 4 Not Detected Not Detected    RSV, PCR Detected (A) Not Detected    Bordetella pertussis pcr Not Detected Not Detected    Bordetella parapertussis PCR Not Detected Not Detected    Chlamydophila pneumoniae PCR Not Detected Not Detected    Mycoplasma pneumo by PCR Not Detected Not Detected   Comprehensive Metabolic Panel    Specimen: Blood   Result Value Ref Range    Glucose 137 (H) 65 - 99 mg/dL    BUN 38 (H) 6 - 20 mg/dL    Creatinine 1.62 (H) 0.76 - 1.27 mg/dL    Sodium 133 (L) 136 - 145 mmol/L    Potassium 4.5 3.5 - 5.2 mmol/L    Chloride 94 (L) 98 - 107 mmol/L    CO2 20.0 (L) 22.0 - 29.0 mmol/L    Calcium 9.4 8.6 - 10.5 mg/dL    Total Protein 6.2 6.0 - 8.5 g/dL    Albumin 3.30 (L) 3.50 - 5.20 g/dL    ALT (SGPT) 28 1 - 41 U/L    AST (SGOT) 28 1 - 40 U/L    Alkaline Phosphatase 55 39 - 117 U/L    Total Bilirubin 0.7 0.0 - 1.2 mg/dL    Globulin 2.9 gm/dL    A/G Ratio 1.1 g/dL    BUN/Creatinine Ratio 23.5 7.0 - 25.0    Anion Gap 19.0 (H) 5.0 - 15.0 mmol/L    eGFR 48.9 (L) >60.0 mL/min/1.73   Protime-INR    Specimen: Blood   Result Value Ref Range    Protime 13.8 (H) 9.6 - 11.7 Seconds    INR 1.36 (H) 0.93 - 1.10   aPTT    Specimen: Blood   Result Value Ref Range    PTT 42.4 (L) 61.0 - 76.5 seconds   Troponin    Specimen: Blood   Result Value Ref Range    Troponin T <0.010 0.000 - 0.030 ng/mL   D-dimer, Quantitative    Specimen: Blood   Result Value Ref Range    D-Dimer, Quantitative 0.89 (H) 0.00 - 0.58 mg/L  (FEU)   CBC Auto Differential    Specimen: Blood   Result Value Ref Range    WBC 13.90 (H) 3.40 - 10.80 10*3/mm3    RBC 5.00 4.14 - 5.80 10*6/mm3    Hemoglobin 16.2 13.0 - 17.7 g/dL    Hematocrit 46.9 37.5 - 51.0 %    MCV 93.6 79.0 - 97.0 fL    MCH 32.5 26.6 - 33.0 pg    MCHC 34.7 31.5 - 35.7 g/dL    RDW 13.4 12.3 - 15.4 %    RDW-SD 45.5 37.0 - 54.0 fl    MPV 7.8 6.0 - 12.0 fL    Platelets 192 140 - 450 10*3/mm3   Scan Slide    Specimen: Blood   Result Value Ref Range    Scan Slide     Manual Differential    Specimen: Blood   Result Value Ref Range    Neutrophil % 46.0 42.7 - 76.0 %    Lymphocyte % 5.0 (L) 19.6 - 45.3 %    Monocyte % 3.0 (L) 5.0 - 12.0 %    Bands %  39.0 (H) 0.0 - 5.0 %    Metamyelocyte % 7.0 (H) 0.0 - 0.0 %    Neutrophils Absolute 11.82 (H) 1.70 - 7.00 10*3/mm3    Lymphocytes Absolute 0.70 0.70 - 3.10 10*3/mm3    Monocytes Absolute 0.42 0.10 - 0.90 10*3/mm3    RBC Morphology Normal Normal    WBC Morphology Normal Normal    Platelet Morphology Normal Normal   POC Lactate    Specimen: Blood   Result Value Ref Range    Lactate 4.0 (C) 0.5 - 2.0 mmol/L   ECG 12 Lead Chest Pain   Result Value Ref Range    QT Interval 285 ms     XR Chest 1 View    Result Date: 12/7/2022  1.     Airspace opacities and consolidation in the left mid and lower lung with suspicion for a small left effusion. 2.     Findings are suspicious for pneumonia given patient's history, but follow-up is recommended to ensure resolution.  Electronically Signed By-Sulaiman Espinoza MD On:12/7/2022 8:44 PM This report was finalized on [id number] by  Sulaiman Espinoza MD.    CT Angiogram Chest Pulmonary Embolism    Result Date: 12/7/2022  1.     Suspected pulmonary embolism in the left upper lobe. 2.     Dense consolidation in the left upper lobe and lingula and additional airspace opacities and consolidation in the left lower lobe with trace pleural effusion. Findings are most suspicious for pneumonia, but follow-up is recommended to ensure  resolution. 3.     Mild emphysema. 4.     Mediastinal and hilar lymphadenopathy, which may be reactive, but follow-up is again recommended. 5.     Mild reflux of contrast into the IVC and hepatic veins which could be seen with right heart failure. No definite septal deviation or pulmonary artery enlargement is seen at this time.  Electronically Signed By-Sulaiman Espinoza MD On:12/7/2022 10:25 PM This report was finalized on [id number] by  Sulaiman Espinoza MD.                                    MDM   Patient had the above evaluation.  Results were discussed with the patient.  Patient did meet sepsis criteria and sepsis protocol was initiated.  Lactic acid was 4.0.  White blood cell count was 13.9 with 39% bands.  Blood cultures were obtained.  Patient was given a 30 cc/kg fluid bolus.  He was started on IV antibiotics.  Chest x-ray is showing left-sided pneumonia.  D-dimer was borderline elevated so CT PE protocol was obtained which showed possible PE in the left upper lobe.  There is reflux of contrast into the IVC and hepatic veins which could be seen with right heart failure.  Patient was started on heparin.  Respiratory panel was positive for RSV.  Patient has acute kidney injury with a BUN of 38 and creatinine 1.62.  His blood pressure remained stable throughout his ER stay.  I discussed with the hospitalist and the patient will be admitted to the PCU.  Critical care time excluding any procedure time totaled 35 minutes.    Final diagnoses:   Sepsis, due to unspecified organism, unspecified whether acute organ dysfunction present (HCC)   Pneumonia due to infectious organism, unspecified laterality, unspecified part of lung   Other acute pulmonary embolism without acute cor pulmonale (HCC)       ED Disposition  ED Disposition     ED Disposition   Decision to Admit    Condition   --    Comment   Level of Care: Progressive Care [20]   Admitting Physician: LAKISHA RAIN [[id number]]               No follow-up provider  specified.       Medication List      No changes were made to your prescriptions during this visit.          Deepak Mckeon MD  12/07/22 [id number]

## 2022-12-08 NOTE — CASE MANAGEMENT/SOCIAL WORK
Discharge Planning Assessment  [facility]     Patient Name: Neftali Rodrigues  MRN: [id number]  Today's Date: 12/8/2022    Admit Date: 12/7/2022    Plan: D/C Plan: Anticipate Routine Home   Discharge Needs Assessment     Row Name 12/08/22 1326       Living Environment    People in Home friend(s)    Name(s) of People in Home Maria Luisa-Friend    Current Living Arrangements home    Primary Care Provided by self    Provides Primary Care For no one    Family Caregiver if Needed friend(s)    Family Caregiver Names Maria Luisa    Able to Return to Prior Arrangements yes       Resource/Environmental Concerns    Resource/Environmental Concerns none       Transition Planning    Patient/Family Anticipates Transition to home    Patient/Family Anticipated Services at Transition none    Transportation Anticipated family or friend will provide       Discharge Needs Assessment    Equipment Currently Used at Home none    Concerns to be Addressed denies needs/concerns at this time    Anticipated Changes Related to Illness none    Equipment Needed After Discharge none    Provided Post Acute Provider List? N/A    Provided Post Acute Provider Quality & Resource List? N/A               Discharge Plan     Row Name 12/08/22 1328       Plan    Plan D/C Plan: Anticipate Routine Home    Plan Comments Spoke with pt at bedside, confirmed Pharmacy. CM offered assist with PCP and pt declines at this time. States his friend, Maria Luisa, will provide transporation home and denies dc needs at this time.              Continued Care and Services - Admitted Since 12/7/2022    Coordination has not been started for this encounter.          Demographic Summary     Row Name 12/08/22 1326       General Information    Admission Type inpatient    Arrived From emergency department    Referral Source emergency department    Reason for Consult discharge planning    Preferred Language English               Functional Status     Row Name 12/08/22 1326       Functional  Status    Usual Activity Tolerance moderate    Current Activity Tolerance fair       Functional Status, IADL    Medications independent    Meal Preparation independent    Housekeeping independent    Laundry independent    Shopping independent       Mental Status    General Appearance WDL WDL       Mental Status Summary    Recent Changes in Mental Status/Cognitive Functioning no changes                Met with patient in room wearing PPE: mask    Maintained distance greater than six feet and spent less than 15 minutes in the room    Zeinab Coombs RN    Phone [phone]  Fax [phone]

## 2022-12-09 ENCOUNTER — APPOINTMENT (OUTPATIENT)
Dept: CARDIOLOGY | Facility: HOSPITAL | Age: 57
End: 2022-12-09

## 2022-12-09 ENCOUNTER — READMISSION MANAGEMENT (OUTPATIENT)
Dept: CALL CENTER | Facility: HOSPITAL | Age: 57
End: 2022-12-09

## 2022-12-09 VITALS
BODY MASS INDEX: 28.2 KG/M2 | SYSTOLIC BLOOD PRESSURE: 111 MMHG | HEIGHT: 73 IN | RESPIRATION RATE: 22 BRPM | DIASTOLIC BLOOD PRESSURE: 70 MMHG | TEMPERATURE: 99.4 F | HEART RATE: 105 BPM | WEIGHT: 212.74 LBS | OXYGEN SATURATION: 92 %

## 2022-12-09 LAB
APTT PPP: 38.2 SECONDS (ref 61–76.5)
APTT PPP: 44 SECONDS (ref 61–76.5)
BH CV LOW VAS LEFT GREATER SAPH AK VESSEL: 1
BH CV LOW VAS LEFT GREATER SAPH BK VESSEL: 1
BH CV LOW VAS RIGHT GREATER SAPH AK VESSEL: 1
BH CV LOW VAS RIGHT GREATER SAPH BK VESSEL: 1
BH CV LOW VAS RIGHT LESSER SAPH VESSEL: 1
BH CV LOWER VASCULAR LEFT COMMON FEMORAL AUGMENT: NORMAL
BH CV LOWER VASCULAR LEFT COMMON FEMORAL COMPETENT: NORMAL
BH CV LOWER VASCULAR LEFT COMMON FEMORAL COMPRESS: NORMAL
BH CV LOWER VASCULAR LEFT COMMON FEMORAL PHASIC: NORMAL
BH CV LOWER VASCULAR LEFT COMMON FEMORAL SPONT: NORMAL
BH CV LOWER VASCULAR LEFT DISTAL FEMORAL COMPRESS: NORMAL
BH CV LOWER VASCULAR LEFT GASTRONEMIUS COMPRESS: NORMAL
BH CV LOWER VASCULAR LEFT GREATER SAPH AK COMPRESS: NORMAL
BH CV LOWER VASCULAR LEFT GREATER SAPH AK THROMBUS: NORMAL
BH CV LOWER VASCULAR LEFT GREATER SAPH BK COMPRESS: NORMAL
BH CV LOWER VASCULAR LEFT GREATER SAPH BK THROMBUS: NORMAL
BH CV LOWER VASCULAR LEFT LESSER SAPH COMPRESS: NORMAL
BH CV LOWER VASCULAR LEFT LESSER SAPH THROMBUS: NORMAL
BH CV LOWER VASCULAR LEFT MID FEMORAL AUGMENT: NORMAL
BH CV LOWER VASCULAR LEFT MID FEMORAL COMPETENT: NORMAL
BH CV LOWER VASCULAR LEFT MID FEMORAL COMPRESS: NORMAL
BH CV LOWER VASCULAR LEFT MID FEMORAL PHASIC: NORMAL
BH CV LOWER VASCULAR LEFT MID FEMORAL SPONT: NORMAL
BH CV LOWER VASCULAR LEFT PERONEAL COMPRESS: NORMAL
BH CV LOWER VASCULAR LEFT POPLITEAL AUGMENT: NORMAL
BH CV LOWER VASCULAR LEFT POPLITEAL COMPETENT: NORMAL
BH CV LOWER VASCULAR LEFT POPLITEAL COMPRESS: NORMAL
BH CV LOWER VASCULAR LEFT POPLITEAL PHASIC: NORMAL
BH CV LOWER VASCULAR LEFT POPLITEAL SPONT: NORMAL
BH CV LOWER VASCULAR LEFT POSTERIOR TIBIAL COMPRESS: NORMAL
BH CV LOWER VASCULAR LEFT PROXIMAL FEMORAL COMPRESS: NORMAL
BH CV LOWER VASCULAR LEFT SAPHENOFEMORAL JUNCTION COMPRESS: NORMAL
BH CV LOWER VASCULAR RIGHT COMMON FEMORAL AUGMENT: NORMAL
BH CV LOWER VASCULAR RIGHT COMMON FEMORAL COMPETENT: NORMAL
BH CV LOWER VASCULAR RIGHT COMMON FEMORAL COMPRESS: NORMAL
BH CV LOWER VASCULAR RIGHT COMMON FEMORAL PHASIC: NORMAL
BH CV LOWER VASCULAR RIGHT COMMON FEMORAL SPONT: NORMAL
BH CV LOWER VASCULAR RIGHT DISTAL FEMORAL COMPRESS: NORMAL
BH CV LOWER VASCULAR RIGHT GASTRONEMIUS COMPRESS: NORMAL
BH CV LOWER VASCULAR RIGHT GREATER SAPH AK COMPRESS: NORMAL
BH CV LOWER VASCULAR RIGHT GREATER SAPH AK THROMBUS: NORMAL
BH CV LOWER VASCULAR RIGHT GREATER SAPH BK COMPRESS: NORMAL
BH CV LOWER VASCULAR RIGHT GREATER SAPH BK THROMBUS: NORMAL
BH CV LOWER VASCULAR RIGHT LESSER SAPH COMPRESS: NORMAL
BH CV LOWER VASCULAR RIGHT LESSER SAPH THROMBUS: NORMAL
BH CV LOWER VASCULAR RIGHT MID FEMORAL AUGMENT: NORMAL
BH CV LOWER VASCULAR RIGHT MID FEMORAL COMPETENT: NORMAL
BH CV LOWER VASCULAR RIGHT MID FEMORAL COMPRESS: NORMAL
BH CV LOWER VASCULAR RIGHT MID FEMORAL PHASIC: NORMAL
BH CV LOWER VASCULAR RIGHT MID FEMORAL SPONT: NORMAL
BH CV LOWER VASCULAR RIGHT PERONEAL COMPRESS: NORMAL
BH CV LOWER VASCULAR RIGHT POPLITEAL AUGMENT: NORMAL
BH CV LOWER VASCULAR RIGHT POPLITEAL COMPETENT: NORMAL
BH CV LOWER VASCULAR RIGHT POPLITEAL COMPRESS: NORMAL
BH CV LOWER VASCULAR RIGHT POPLITEAL PHASIC: NORMAL
BH CV LOWER VASCULAR RIGHT POPLITEAL SPONT: NORMAL
BH CV LOWER VASCULAR RIGHT POSTERIOR TIBIAL COMPRESS: NORMAL
BH CV LOWER VASCULAR RIGHT PROXIMAL FEMORAL COMPRESS: NORMAL
BH CV LOWER VASCULAR RIGHT SAPHENOFEMORAL JUNCTION COMPRESS: NORMAL
DEPRECATED RDW RBC AUTO: 43.8 FL (ref 37–54)
ERYTHROCYTE [DISTWIDTH] IN BLOOD BY AUTOMATED COUNT: 13.4 % (ref 12.3–15.4)
HCT VFR BLD AUTO: 42 % (ref 37.5–51)
HGB BLD-MCNC: 13.7 G/DL (ref 13–17.7)
LYMPHOCYTES # BLD MANUAL: 0.95 10*3/MM3 (ref 0.7–3.1)
LYMPHOCYTES NFR BLD MANUAL: 1 % (ref 5–12)
MAXIMAL PREDICTED HEART RATE: 162 BPM
MCH RBC QN AUTO: 30.9 PG (ref 26.6–33)
MCHC RBC AUTO-ENTMCNC: 32.6 G/DL (ref 31.5–35.7)
MCV RBC AUTO: 94.7 FL (ref 79–97)
MONOCYTES # BLD: 0.14 10*3/MM3 (ref 0.1–0.9)
NEUTROPHILS # BLD AUTO: 12.42 10*3/MM3 (ref 1.7–7)
NEUTROPHILS NFR BLD MANUAL: 51 % (ref 42.7–76)
NEUTS BAND NFR BLD MANUAL: 41 % (ref 0–5)
NEUTS VAC BLD QL SMEAR: ABNORMAL
PLAT MORPH BLD: NORMAL
PLATELET # BLD AUTO: 161 10*3/MM3 (ref 140–450)
PMV BLD AUTO: 7.7 FL (ref 6–12)
QT INTERVAL: 331 MS
RBC # BLD AUTO: 4.43 10*6/MM3 (ref 4.14–5.8)
RBC MORPH BLD: NORMAL
SCAN SLIDE: NORMAL
STRESS TARGET HR: 138 BPM
TOXIC GRANULATION: ABNORMAL
VARIANT LYMPHS NFR BLD MANUAL: 7 % (ref 19.6–45.3)
WBC NRBC COR # BLD: 13.5 10*3/MM3 (ref 3.4–10.8)

## 2022-12-09 PROCEDURE — 25010000002 CEFTRIAXONE PER 250 MG: Performed by: INTERNAL MEDICINE

## 2022-12-09 PROCEDURE — 93010 ELECTROCARDIOGRAM REPORT: CPT | Performed by: INTERNAL MEDICINE

## 2022-12-09 PROCEDURE — 25010000002 HEPARIN (PORCINE) 25000-0.45 UT/250ML-% SOLUTION: Performed by: EMERGENCY MEDICINE

## 2022-12-09 PROCEDURE — 93970 EXTREMITY STUDY: CPT

## 2022-12-09 PROCEDURE — 85730 THROMBOPLASTIN TIME PARTIAL: CPT | Performed by: INTERNAL MEDICINE

## 2022-12-09 PROCEDURE — 93005 ELECTROCARDIOGRAM TRACING: CPT | Performed by: INTERNAL MEDICINE

## 2022-12-09 PROCEDURE — 25010000002 MORPHINE PER 10 MG: Performed by: INTERNAL MEDICINE

## 2022-12-09 RX ORDER — HYDROCODONE BITARTRATE AND ACETAMINOPHEN 7.5; 325 MG/1; MG/1
1 TABLET ORAL EVERY 6 HOURS PRN
Qty: 10 TABLET | Refills: 0 | Status: SHIPPED | OUTPATIENT
Start: 2022-12-09 | End: 2022-12-15

## 2022-12-09 RX ORDER — DOXYCYCLINE 100 MG/1
100 TABLET ORAL EVERY 12 HOURS SCHEDULED
Qty: 11 TABLET | Refills: 0 | Status: SHIPPED | OUTPATIENT
Start: 2022-12-09 | End: 2022-12-15

## 2022-12-09 RX ORDER — IPRATROPIUM/ALBUTEROL SULFATE 20-100 MCG
1 MIST INHALER (GRAM) INHALATION 4 TIMES DAILY PRN
Qty: 1 EACH | Refills: 0 | Status: SHIPPED | OUTPATIENT
Start: 2022-12-09

## 2022-12-09 RX ORDER — CEFDINIR 300 MG/1
300 CAPSULE ORAL 2 TIMES DAILY
Qty: 14 CAPSULE | Refills: 0 | Status: SHIPPED | OUTPATIENT
Start: 2022-12-09

## 2022-12-09 RX ADMIN — NICOTINE 1 PATCH: 21 PATCH, EXTENDED RELEASE TRANSDERMAL at 10:00

## 2022-12-09 RX ADMIN — MORPHINE SULFATE 2 MG: 2 INJECTION, SOLUTION INTRAMUSCULAR; INTRAVENOUS at 10:07

## 2022-12-09 RX ADMIN — DOXYCYCLINE 100 MG: 100 TABLET ORAL at 09:56

## 2022-12-09 RX ADMIN — HEPARIN SODIUM 15.7 UNITS/KG/HR: 10000 INJECTION, SOLUTION INTRAVENOUS at 05:31

## 2022-12-09 RX ADMIN — CEFTRIAXONE 1 G: 1 INJECTION, POWDER, FOR SOLUTION INTRAMUSCULAR; INTRAVENOUS at 06:28

## 2022-12-09 RX ADMIN — FAMOTIDINE 20 MG: 20 TABLET, FILM COATED ORAL at 09:56

## 2022-12-09 RX ADMIN — GUAIFENESIN 600 MG: 600 TABLET, EXTENDED RELEASE ORAL at 09:56

## 2022-12-09 RX ADMIN — HEPARIN SODIUM 17.7 UNITS/KG/HR: 10000 INJECTION, SOLUTION INTRAVENOUS at 07:45

## 2022-12-09 NOTE — DISCHARGE SUMMARY
[facility] Medicine Services  DISCHARGE SUMMARY    Patient Name: Neftali Rodrigues  : 1965  MRN: [id number]    Date of Admission: 2022  Discharge Diagnosis: PE  Date of Discharge: 22  Primary Care Physician: Provider, No Known    Presenting Problem:   Other acute pulmonary embolism without acute cor pulmonale (HCC) [I26.99]  Pneumonia due to infectious organism, unspecified laterality, unspecified part of lung [J18.9]  Sepsis, due to unspecified organism, unspecified whether acute organ dysfunction present (HCC) [A41.9]    Active and Resolved Hospital Problems:  Active Hospital Problems    Diagnosis POA   • **Sepsis, due to unspecified organism, unspecified whether acute organ dysfunction present (HCC) [A41.9] Yes      Resolved Hospital Problems   No resolved problems to display.     Acute Left Upper Lobe Pulmonary Embolus-Suspected  • Admission: Troponin within normal limits, saturating well on room air, not hypotensive/mildly tachycardic  • Imaging: CT PE protocol noted above.  See report for full details  • ECG: Personally reviewed.  2022 sinus tachycardia.  Heart rate 123.  Incomplete right bundle branch block.  Nonspecific ST-T wave findings.  No prior for comparison locally        troponin-neg.        Heparin gtt, transition to oral anticoagulant.        blood cultures-neg    Acute left upper lobe pneumonia  • Admission: Afebrile, WBC 13.9, lactate 4.0,  • Imaging: CT chest noted above.  See report for details  • Misc/Context: Possibly secondary bacterial infection from PE.  JODY/OSCAR prn  Mucinex BID  Empiric antibiotics.    Acute RSV viral infection  • Admission: Respiratory viral panel positive for RSV    Sepsis-secondary to above-ruled out  • Admission: See lab values as above  •   History of hepatitis C  • Admission: LFTs within normal limits  • Has been treated per pt    Tobacco use/nicotine dependence  • 1 PPD-Tobacco use cessation advised and  counseling provided. Nicotine patch.  •   Former substance abuse  • Endorses previous use of multiple agents  •   Overweight  • Body mass index is 26.99 kg/m².  Hospital Course     Hospital Course:  History of Present Illness: Neftali Rodrigues is a 57 y.o. male who presented to [facility] on 12/7/2022 complaining of above.  He described developing fevers and chills about 3 days ago and then subsequently having severe 10 out of 10 sharp left-sided chest discomfort over the next few days.  He noted profound weakness and had endorsed pleurisy.  States that when he breathes through his nose he is able to control the chest pain though when he takes deep breaths the pain is severe.  He denies any known history of venothromboembolism.He denies any calf pain or swelling he denies any history of COPD though chart notes this diagnosis.  He is not chronically on any supplemental oxygen and does not take any inhalers at home.  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  He denies any known sick contacts.  He describes occasional some loose stools.  He also has a mild headache today.  No neck pain no photophobia.    12/9/22 patient seen and examined in bed acute distress, patient is very adamant about going home.  Discussed with RN.  Will discharge patient home on Eliquis.  Condition at discharge stable.     DISCHARGE Follow Up Recommendations for labs and diagnostics  Follow-up with PCP and hematology in 1 week.    Reasons For Change In Medications and Indications for New Medications:   START taking:   apixaban (ELIQUIS)    cefdinir (OMNICEF)    Combivent Respimat (ipratropium-albuterol)    doxycycline (ADOXA)    HYDROcodone-acetaminophen (NORCO  Day of Discharge     Vital Signs:  Temp:  [97.7 °F (36.5 °C)-99.4 °F (37.4 °C)] 99.4 °F (37.4 °C)  Heart Rate:  [] 105  Resp:  [19-28] 22  BP: (106-143)/(64-93) 111/70  Flow (L/min):   [1.5] 1.5    Physical Exam   GEN/CONS:   Vitals noted above, NAD, AOX3, saturating well on room air, well-built white male   EYES:  Conjunctiva pink   ENMT:  NECK:  Atraumatic external nose/ears, Oropharynx clear  Symmetric, trachea midline   CV:  Regular, Reg S1/S2, no murmur   PULM:  Diminished breath sounds bilaterally likely secondary to inspiratory effort, no wheezes   GI:  : Soft, Nontender, Nondistended   Deferred.    MSK:  No cyanosis, No LE edema   NEURO:    Sensation and motor symmetric and grossly intact and nonfocal--moving 4 extremities.   PSYCH:   Appropriate mood and affect           Pertinent  and/or Most Recent Results     LAB RESULTS:      Lab 12/09/22  0657 12/08/22  2329 12/08/22  1141 12/08/22  0427 12/08/22  0051 12/07/22 2007 12/07/22 2003   WBC  --  13.50*  --  13.50*  --   --  13.90*   HEMOGLOBIN  --  13.7  --  14.7  --   --  16.2   HEMATOCRIT  --  42.0  --  45.2  --   --  46.9   PLATELETS  --  161  --  178  --   --  192   NEUTROS ABS  --  12.42*  --  10.26*  --   --  11.82*   EOS ABS  --   --   --  0.27  --   --   --    MCV  --  94.7  --  95.1  --   --  93.6   LACTATE  --   --   --   --  3.3* 4.0*  --    PROTIME  --   --   --   --   --   --  13.8*   APTT 38.2* 44.0* 65.0 77.7*  --   --  42.4*         Lab 12/08/22  0427 12/07/22 2003   SODIUM 130* 133*   POTASSIUM 4.2 4.5   CHLORIDE 95* 94*   CO2 21.0* 20.0*   ANION GAP 14.0 19.0*   BUN 31* 38*   CREATININE 1.16 1.62*   EGFR 73.0 48.9*   GLUCOSE 136* 137*   CALCIUM 8.3* 9.4         Lab 12/07/22 2003   TOTAL PROTEIN 6.2   ALBUMIN 3.30*   GLOBULIN 2.9   ALT (SGPT) 28   AST (SGOT) 28   BILIRUBIN 0.7   ALK PHOS 55         Lab 12/08/22  0511 12/08/22  0051 12/07/22 2003   PROBNP  --   --  1,203.0*   TROPONIN T <0.010 <0.010 <0.010   PROTIME  --   --  13.8*   INR  --   --  1.36*                 Brief Urine Lab Results     None        Microbiology Results (last 10 days)     Procedure Component Value - Date/Time    MRSA Screen, PCR  (Inpatient) - Swab, Nares [[id number]]  (Normal) Collected: 12/08/22 0057    Lab Status: Final result Specimen: Swab from Nares Updated: 12/08/22 0224     MRSA PCR No MRSA Detected    Narrative:      The negative predictive value of this diagnostic test is high and should only be used to consider de-escalating anti-MRSA therapy. A positive result may indicate colonization with MRSA and must be correlated clinically.    Respiratory Panel PCR w/COVID-19(SARS-CoV-2) [facility]ENÉE In-House, NP Swab in UTM/VTM, 3-4 HR TAT - Swab, Nasopharynx [[id number]]  (Abnormal) Collected: 12/07/22 2004    Lab Status: Final result Specimen: Swab from Nasopharynx Updated: 12/07/22 2126     ADENOVIRUS, PCR Not Detected     Coronavirus 229E Not Detected     Coronavirus HKU1 Not Detected     Coronavirus NL63 Not Detected     Coronavirus OC43 Not Detected     COVID19 Not Detected     Human Metapneumovirus Not Detected     Human Rhinovirus/Enterovirus Not Detected     Influenza A PCR Not Detected     Influenza B PCR Not Detected     Parainfluenza Virus 1 Not Detected     Parainfluenza Virus 2 Not Detected     Parainfluenza Virus 3 Not Detected     Parainfluenza Virus 4 Not Detected     RSV, PCR Detected     Bordetella pertussis pcr Not Detected     Bordetella parapertussis PCR Not Detected     Chlamydophila pneumoniae PCR Not Detected     Mycoplasma pneumo by PCR Not Detected    Narrative:      In the setting of a positive respiratory panel with a viral infection PLUS a negative procalcitonin without other underlying concern for bacterial infection, consider observing off antibiotics or discontinuation of antibiotics and continue supportive care. If the respiratory panel is positive for atypical bacterial infection (Bordetella pertussis, Chlamydophila pneumoniae, or Mycoplasma pneumoniae), consider antibiotic de-escalation to target atypical bacterial infection.    Blood Culture - Blood, Arm, Right [[id number]]  (Normal)  Collected: 12/07/22 2004    Lab Status: Preliminary result Specimen: Blood from Arm, Right Updated: 12/08/22 2015     Blood Culture No growth at 24 hours    Narrative:      Less than seven (7) mL's of blood was collected.  Insufficient quantity may yield false negative results.    Blood Culture - Blood, Arm, Left [[id number]]  (Normal) Collected: 12/07/22 2004    Lab Status: Preliminary result Specimen: Blood from Arm, Left Updated: 12/08/22 2015     Blood Culture No growth at 24 hours    Narrative:      Less than seven (7) mL's of blood was collected.  Insufficient quantity may yield false negative results.          XR Chest 1 View    Result Date: 12/7/2022  Impression: 1.     Airspace opacities and consolidation in the left mid and lower lung with suspicion for a small left effusion. 2.     Findings are suspicious for pneumonia given patient's history, but follow-up is recommended to ensure resolution.  Electronically Signed By-Sulaiman Espinoza MD On:12/7/2022 8:44 PM This report was finalized on [id number] by  Sulaiman Espinoza MD.    CT Angiogram Chest Pulmonary Embolism    Result Date: 12/7/2022  Impression: 1.     Suspected pulmonary embolism in the left upper lobe. 2.     Dense consolidation in the left upper lobe and lingula and additional airspace opacities and consolidation in the left lower lobe with trace pleural effusion. Findings are most suspicious for pneumonia, but follow-up is recommended to ensure resolution. 3.     Mild emphysema. 4.     Mediastinal and hilar lymphadenopathy, which may be reactive, but follow-up is again recommended. 5.     Mild reflux of contrast into the IVC and hepatic veins which could be seen with right heart failure. No definite septal deviation or pulmonary artery enlargement is seen at this time.  Electronically Signed By-Sulaiman Espinoza MD On:12/7/2022 10:25 PM This report was finalized on [id number] by  Sulaiman Espinoza MD.              Results for orders placed during the  hospital encounter of 12/07/22    Adult Transthoracic Echo Complete W/ Cont if Necessary Per Protocol    Interpretation Summary  •  Left ventricular ejection fraction appears to be 56 - 60%.  •  The right ventricular cavity is mildly dilated.  •  Estimated right ventricular systolic pressure from tricuspid regurgitation is normal (<35 mmHg).      Labs Pending at Discharge:  Pending Labs     Order Current Status    Pathology Consultation Collected (12/08/22 9702)    Blood Culture - Blood, Arm, Left Preliminary result    Blood Culture - Blood, Arm, Right Preliminary result          Procedures Performed           Consults:   Consults     Date and Time Order Name Status Description    12/8/2022  8:49 AM Hematology & Oncology Inpatient Consult Completed     12/7/2022 10:50 PM Hospitalist (on-call MD unless specified)              Discharge Details        Discharge Medications      New Medications      Instructions Start Date   apixaban 5 MG tablet tablet  Commonly known as: ELIQUIS   5 mg, Oral, 2 Times Daily      apixaban 5 MG tablet tablet  Commonly known as: ELIQUIS   10 mg, Oral, 2 Times Daily      cefdinir 300 MG capsule  Commonly known as: OMNICEF   300 mg, Oral, 2 Times Daily      Combivent Respimat  MCG/ACT inhaler  Generic drug: ipratropium-albuterol   1 puff, Inhalation, 4 Times Daily PRN      doxycycline 100 MG tablet  Commonly known as: ADOXA   100 mg, Oral, Every 12 Hours Scheduled      HYDROcodone-acetaminophen 7.5-325 MG per tablet  Commonly known as: NORCO   1 tablet, Oral, Every 6 Hours PRN             No Known Allergies      Discharge Disposition:   Home or Self Care    Diet:  Hospital:  Diet Order   Procedures   • Diet: Cardiac Diets; Healthy Heart (2-3 Na+); Texture: Regular Texture (IDDSI 7); Fluid Consistency: Thin (IDDSI 0)         Discharge Activity:   Activity Instructions     Gradually Increase Activity Until at Pre-Hospitalization Level              CODE STATUS:  There are no questions  and answers to display.         No future appointments.    Additional Instructions for the Follow-ups that You Need to Schedule     Discharge Follow-up with PCP   As directed       Currently Documented PCP:    Provider, No Known    PCP Phone Number:    None     Follow Up Details: 1 week         Discharge Follow-up with Specified Provider: hematology; 1 Week   As directed      To: hematology    Follow Up: 1 Week               Time spent on Discharge including face to face service: 35 minutes    This patient has been examined wearing appropriate Personal Protective Equipment and discussed with rn. 12/09/22      Signature: Electronically signed by James Schmitz MD, 12/09/22, 12:38 PM EST.

## 2022-12-09 NOTE — NURSING NOTE
Pt aggitated this am wanting to go home, Dr. Paredes felt additional 24 hours needed prior to discharge, pt not wanting to stay and expressed disire to leave, called primary Md stating pt to leave AMA, discharge orders received and noted, reviewed discharge with pt and he signed instructions, wife to  per personal car. IV's times two removed from left arm, pressure drsg applied.

## 2022-12-09 NOTE — CASE MANAGEMENT/SOCIAL WORK
Continued Stay Note   [facility]     Patient Name: Neftali Rodrigues  MRN: [id number]  Today's Date: 12/9/2022    Admit Date: 12/7/2022    Plan: D/C Plan: Anticipate Routine Home   Discharge Plan     Row Name 12/09/22 1636       Plan    Plan Comments Walking oximetry ordered. Per RT, patient discharged prior to walking oximetry being performed. MD ordered Eliquis. Patient listed as Medicaid pending. CM unable to test claim cost prior to discharge. MSSW unable to screen for medication costs prior to discharge. SAFE Report completed, reference #[id number].    Final Discharge Disposition Code 01 - home or self-care              Expected Discharge Date and Time     Expected Discharge Date Expected Discharge Time    Dec 9, 2022         Case Management Discharge Note      Selected Continued Care - Discharged on 12/9/2022 Admission date: 12/7/2022 - Discharge disposition: Home or Self Care            Transportation Services  Private: Car    Final Discharge Disposition Code: 01 - home or self-care     Phone communication or documentation only - no physical contact with patient or family.    Belkis Christopher RN     Office Phone: [phone]  Office Cell: [phone]

## 2022-12-09 NOTE — PLAN OF CARE
Goal Outcome Evaluation:     The patient had complaints of chest pain over night. Ordered an EKG. The EKG resulted normal. The patient has a heparin drip running @ 15.7. VS stable.

## 2022-12-10 LAB — QT INTERVAL: 285 MS

## 2022-12-10 NOTE — OUTREACH NOTE
Prep Survey    Flowsheet Row Responses   [facility] facility patient discharged from? [facility]   Is LACE score < 7 ? No   Emergency Room discharge w/ pulse ox? No   Eligibility Readm Mgmt   Discharge diagnosis acute pulmonary embolism without acute cor pulmonale, sepsis PNA   Does the patient have one of the following disease processes/diagnoses(primary or secondary)? Sepsis   Does the patient have Home health ordered? No   Is there a DME ordered? No   Prep survey completed? Yes          NOHELIA BARGER - Registered Nurse

## 2022-12-12 LAB
BACTERIA SPEC AEROBE CULT: NORMAL
BACTERIA SPEC AEROBE CULT: NORMAL

## 2022-12-13 ENCOUNTER — READMISSION MANAGEMENT (OUTPATIENT)
Dept: CALL CENTER | Facility: HOSPITAL | Age: 57
End: 2022-12-13

## 2022-12-13 NOTE — OUTREACH NOTE
Sepsis Week 1 Survey    Flowsheet Row Responses   [facility] patient discharged from? [facility]   Does the patient have one of the following disease processes/diagnoses(primary or secondary)? Sepsis   Week 1 attempt successful? Yes   Call start time 1037   Call end time 1046   Discharge diagnosis acute pulmonary embolism without acute cor pulmonale, sepsis PNA   Person spoke with today (if not patient) and relationship Samantha, ex-wife   Medication alerts for this patient ELIQUIS---wife is unsure but will check with him to ensure he has picked up.   This RN attempted to call pharmacy but unable to contact--staff with pharmacy did not  phone line.   Meds reviewed with patient/caregiver? Yes   Is the patient having any side effects they believe may be caused by any medication additions or changes? No   Does the patient have all medications related to this admission filled (includes all antibiotics, inhalers, nebulizers,steroids,etc.) No   What is keeping the patient from filling the prescriptions? Financial   Nursing Interventions No intervention needed  [Pt did not  Norco for pain control--will use OTC medication]   Prescription comments Wife was unaware that pt was prescribed an inhaler--education provided   Is the patient taking all medications as directed (includes completed medication regime)? Yes   Does the patient have a primary care provider?  No   PCP Nursing Intervention Provided number to obtain PCP   Does the patient have an appointment with their PCP within 7 days of discharge? No   Nursing Interventions Advised patient to make appointment   Has the patient kept scheduled appointments due by today? N/A   Comments Encouraged to schedule appt with Hematology per AVS   Has home health visited the patient within 72 hours of discharge? N/A   Psychosocial issues? No   Did the patient receive a copy of their discharge instructions? Yes   Nursing interventions Reviewed instructions with patient   "[with wife]   What is the patient's perception of their health status since discharge? Improving  [Family reports that pt is \"alot better.\"  Still has some weakness and pain in chest due to \"blood clot.\" ]   Nursing interventions Nurse provided patient education   Is the patient/caregiver able to teach back TIME? T emperature - higher or lower than normal, I nfection - may have signs and symptoms of an infection, M ental Decline - confused, sleepy, difficult to arouse, E xtremely Ill - severe pain, discomfort, shortness of breath  [REviewed and encouraged to monitor for worsening respiratory s/s]   Nursing interventions Nurse provided patient education   Is the patient/caregiver able to teach back signs and symptoms of worsening condition: Fever, Altered mental status(confusion/coma)   Is the patient/caregiver able to teach back the hierarchy of who to call/visit for symptoms/problems? PCP, Specialist, Home health nurse, Urgent Care, ED, 911 Yes   Week 1 call completed? Yes          KRYSTYNA LUQUE - Registered Nurse  "

## 2022-12-21 ENCOUNTER — READMISSION MANAGEMENT (OUTPATIENT)
Dept: CALL CENTER | Facility: HOSPITAL | Age: 57
End: 2022-12-21

## 2022-12-21 NOTE — OUTREACH NOTE
Sepsis Week 2 Survey    Flowsheet Row Responses   [facility] patient discharged from? [facility]   Does the patient have one of the following disease processes/diagnoses(primary or secondary)? Sepsis   Week 2 attempt successful? Yes   Call start time 1416   Call end time 1451   Discharge diagnosis acute pulmonary embolism without acute cor pulmonale, sepsis PNA   Person spoke with today (if not patient) and relationship Patient   Meds reviewed with patient/caregiver? Yes   Is the patient having any side effects they believe may be caused by any medication additions or changes? No   Does the patient have all medications related to this admission filled (includes all antibiotics, inhalers, nebulizers,steroids,etc.) No   What is keeping the patient from filling the prescriptions? Financial   Nursing Interventions Nurse provided patient education, Nurse advised patient to call provider   Notified Case Management Financial   Is the patient taking all medications as directed (includes completed medication regime)? No   What is preventing the patient from taking all medications as directed? Doesn't understand importance, Other   Nursing Interventions Nurse provided patient education   Does the patient have a primary care provider?  No   PCP Nursing Intervention Provided number to obtain PCP   Does the patient have an appointment with their PCP within 7 days of discharge? No   Nursing Interventions Educated patient on importance of making appointment, Advised patient to make appointment   Has the patient kept scheduled appointments due by today? N/A   Psychosocial issues? No   What is the patient's perception of their health status since discharge? Improving   Nursing interventions Nurse provided patient education   Is the patient/caregiver able to teach back TIME? T emperature - higher or lower than normal, I nfection - may have signs and symptoms of an infection, M ental Decline - confused, sleepy, difficult to arouse, E  xtremely Ill - severe pain, discomfort, shortness of breath   Nursing interventions Nurse provided patient education   Is patient/caregiver able to teach back steps to recovery at home? Rest and regain strength, Eat a balanced diet   Is the patient/caregiver able to teach back signs and symptoms of worsening condition: Fever, Shortness of breath/rapid respiratory rate, Altered mental status(confusion/coma)   Week 2 call completed? Yes   Wrap up additional comments Pt felt like pain in chest moved down. Pt does not have Eliquis r/t expense/insurance not covering Eliquis. This RN will send a message to hospital  to see if there is any help getting Eliquis. Additionally, will send to Ambulatory KHAI ELISE - Registered Nurse

## 2022-12-30 ENCOUNTER — READMISSION MANAGEMENT (OUTPATIENT)
Dept: CALL CENTER | Facility: HOSPITAL | Age: 57
End: 2022-12-30

## 2022-12-30 NOTE — OUTREACH NOTE
Sepsis Week 3 Survey    Flowsheet Row Responses   [facility] facility patient discharged from? [facility]   Does the patient have one of the following disease processes/diagnoses(primary or secondary)? Sepsis   Week 3 attempt successful? No   Unsuccessful attempts Attempt 1          FE ASENCIO - Registered Nurse

## 2023-01-03 ENCOUNTER — READMISSION MANAGEMENT (OUTPATIENT)
Dept: CALL CENTER | Facility: HOSPITAL | Age: 58
End: 2023-01-03
Payer: MEDICAID

## 2024-09-30 ENCOUNTER — OFFICE VISIT (OUTPATIENT)
Dept: ORTHOPEDIC SURGERY | Facility: CLINIC | Age: 59
End: 2024-09-30
Payer: MEDICAID

## 2024-09-30 ENCOUNTER — PREP FOR SURGERY (OUTPATIENT)
Dept: OTHER | Facility: HOSPITAL | Age: 59
End: 2024-09-30
Payer: MEDICAID

## 2024-09-30 ENCOUNTER — TELEPHONE (OUTPATIENT)
Dept: ORTHOPEDIC SURGERY | Facility: CLINIC | Age: 59
End: 2024-09-30

## 2024-09-30 VITALS
HEART RATE: 94 BPM | RESPIRATION RATE: 20 BRPM | OXYGEN SATURATION: 94 % | WEIGHT: 212 LBS | BODY MASS INDEX: 28.1 KG/M2 | HEIGHT: 73 IN

## 2024-09-30 DIAGNOSIS — S42.021A CLOSED DISPLACED FRACTURE OF SHAFT OF RIGHT CLAVICLE, INITIAL ENCOUNTER: Primary | ICD-10-CM

## 2024-09-30 PROCEDURE — 1159F MED LIST DOCD IN RCRD: CPT | Performed by: PHYSICIAN ASSISTANT

## 2024-09-30 PROCEDURE — 99203 OFFICE O/P NEW LOW 30 MIN: CPT | Performed by: PHYSICIAN ASSISTANT

## 2024-09-30 PROCEDURE — 1160F RVW MEDS BY RX/DR IN RCRD: CPT | Performed by: PHYSICIAN ASSISTANT

## 2024-09-30 RX ORDER — OXYCODONE AND ACETAMINOPHEN 5; 325 MG/1; MG/1
1 TABLET ORAL EVERY 6 HOURS PRN
Qty: 28 TABLET | Refills: 0 | Status: SHIPPED | OUTPATIENT
Start: 2024-09-30 | End: 2024-10-03 | Stop reason: SDUPTHER

## 2024-09-30 RX ORDER — HYDROCODONE BITARTRATE AND ACETAMINOPHEN 5; 325 MG/1; MG/1
1 TABLET ORAL EVERY 8 HOURS PRN
Qty: 12 TABLET | Refills: 0 | Status: SHIPPED | OUTPATIENT
Start: 2024-09-30 | End: 2024-10-02 | Stop reason: HOSPADM

## 2024-09-30 RX ORDER — IBUPROFEN 200 MG
200 TABLET ORAL EVERY 6 HOURS PRN
COMMUNITY

## 2024-09-30 RX ORDER — HYDROCODONE BITARTRATE AND ACETAMINOPHEN 5; 325 MG/1; MG/1
TABLET ORAL
COMMUNITY
Start: 2024-09-27 | End: 2024-09-30 | Stop reason: SDUPTHER

## 2024-09-30 RX ORDER — PROMETHAZINE HYDROCHLORIDE 12.5 MG/1
12.5 TABLET ORAL EVERY 6 HOURS PRN
Qty: 21 TABLET | Refills: 1 | Status: SHIPPED | OUTPATIENT
Start: 2024-09-30 | End: 2024-10-03 | Stop reason: SDUPTHER

## 2024-09-30 RX ORDER — CEPHALEXIN 500 MG/1
500 CAPSULE ORAL 4 TIMES DAILY
Qty: 4 CAPSULE | Refills: 0 | Status: SHIPPED | OUTPATIENT
Start: 2024-09-30 | End: 2024-10-01
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
## 2024-09-30 NOTE — TELEPHONE ENCOUNTER
PATIENT ASKED AT CHECK OUT ABOUT PAIN MEDICATION AND IF IT WOULD BE SENT IN. STATED IT WAS DISCUSSED BUT WASN'T TOLD YES OR NO. PATIENT WOULD LIKE A CALL [phone]

## 2024-10-01 ENCOUNTER — HOSPITAL ENCOUNTER (OUTPATIENT)
Dept: CARDIOLOGY | Facility: HOSPITAL | Age: 59
Discharge: HOME | End: 2024-10-01
Payer: MEDICAID

## 2024-10-01 ENCOUNTER — LAB (OUTPATIENT)
Dept: LAB | Facility: HOSPITAL | Age: 59
End: 2024-10-01
Payer: MEDICAID

## 2024-10-01 DIAGNOSIS — S42.021A CLOSED DISPLACED FRACTURE OF SHAFT OF RIGHT CLAVICLE, INITIAL ENCOUNTER: ICD-10-CM

## 2024-10-01 LAB
ABO GROUP BLD: NORMAL
ANION GAP SERPL CALCULATED.3IONS-SCNC: 10 MMOL/L (ref 5–15)
APTT PPP: 31.3 SECONDS (ref 24–31)
BASOPHILS # BLD AUTO: 0.06 10*3/MM3 (ref 0–0.2)
BASOPHILS NFR BLD AUTO: 0.8 % (ref 0–1.5)
BLD GP AB SCN SERPL QL: NEGATIVE
BUN SERPL-MCNC: 15 MG/DL (ref 8–23)
BUN/CREAT SERPL: 22.7 (ref 7–25)
CALCIUM SPEC-SCNC: 9.2 MG/DL (ref 8.6–10.5)
CHLORIDE SERPL-SCNC: 102 MMOL/L (ref 98–107)
CO2 SERPL-SCNC: 24 MMOL/L (ref 22–29)
CREAT SERPL-MCNC: 0.66 MG/DL (ref 0.76–1.27)
DEPRECATED RDW RBC AUTO: 47.2 FL (ref 37–54)
EGFRCR SERPLBLD CKD-EPI 2021: 107.4 ML/MIN/1.73
EOSINOPHIL # BLD AUTO: 0.3 10*3/MM3 (ref 0–0.4)
EOSINOPHIL NFR BLD AUTO: 4 % (ref 0.3–6.2)
ERYTHROCYTE [DISTWIDTH] IN BLOOD BY AUTOMATED COUNT: 13.3 % (ref 12.3–15.4)
GLUCOSE SERPL-MCNC: 101 MG/DL (ref 65–99)
HCT VFR BLD AUTO: 37.9 % (ref 37.5–51)
HGB BLD-MCNC: 12.6 G/DL (ref 13–17.7)
IMM GRANULOCYTES # BLD AUTO: 0.03 10*3/MM3 (ref 0–0.05)
IMM GRANULOCYTES NFR BLD AUTO: 0.4 % (ref 0–0.5)
INR PPP: 1 (ref 0.93–1.1)
LYMPHOCYTES # BLD AUTO: 1.69 10*3/MM3 (ref 0.7–3.1)
LYMPHOCYTES NFR BLD AUTO: 22.4 % (ref 19.6–45.3)
MCH RBC QN AUTO: 32.1 PG (ref 26.6–33)
MCHC RBC AUTO-ENTMCNC: 33.2 G/DL (ref 31.5–35.7)
MCV RBC AUTO: 96.4 FL (ref 79–97)
MONOCYTES # BLD AUTO: 0.8 10*3/MM3 (ref 0.1–0.9)
MONOCYTES NFR BLD AUTO: 10.6 % (ref 5–12)
NEUTROPHILS NFR BLD AUTO: 4.67 10*3/MM3 (ref 1.7–7)
NEUTROPHILS NFR BLD AUTO: 61.8 % (ref 42.7–76)
NRBC BLD AUTO-RTO: 0 /100 WBC (ref 0–0.2)
PLATELET # BLD AUTO: 673 10*3/MM3 (ref 140–450)
PMV BLD AUTO: 9.3 FL (ref 6–12)
POTASSIUM SERPL-SCNC: 4.3 MMOL/L (ref 3.5–5.2)
PROTHROMBIN TIME: 10.9 SECONDS (ref 9.6–11.7)
QT INTERVAL: 368 MS
QTC INTERVAL: 440 MS
RBC # BLD AUTO: 3.93 10*6/MM3 (ref 4.14–5.8)
RH BLD: POSITIVE
SODIUM SERPL-SCNC: 136 MMOL/L (ref 136–145)
T&S EXPIRATION DATE: NORMAL
WBC NRBC COR # BLD AUTO: 7.55 10*3/MM3 (ref 3.4–10.8)

## 2024-10-01 PROCEDURE — 86900 BLOOD TYPING SEROLOGIC ABO: CPT

## 2024-10-01 PROCEDURE — 85610 PROTHROMBIN TIME: CPT

## 2024-10-01 PROCEDURE — 85730 THROMBOPLASTIN TIME PARTIAL: CPT

## 2024-10-01 PROCEDURE — 86850 RBC ANTIBODY SCREEN: CPT

## 2024-10-01 PROCEDURE — 85025 COMPLETE CBC W/AUTO DIFF WBC: CPT

## 2024-10-01 PROCEDURE — 93005 ELECTROCARDIOGRAM TRACING: CPT | Performed by: ORTHOPAEDIC SURGERY

## 2024-10-01 PROCEDURE — 80048 BASIC METABOLIC PNL TOTAL CA: CPT

## 2024-10-01 PROCEDURE — 36415 COLL VENOUS BLD VENIPUNCTURE: CPT

## 2024-10-01 PROCEDURE — 86901 BLOOD TYPING SEROLOGIC RH(D): CPT

## 2024-10-02 ENCOUNTER — APPOINTMENT (OUTPATIENT)
Dept: GENERAL RADIOLOGY | Facility: HOSPITAL | Age: 59
End: 2024-10-02
Payer: MEDICAID

## 2024-10-02 ENCOUNTER — ANESTHESIA (OUTPATIENT)
Dept: PERIOP | Facility: HOSPITAL | Age: 59
End: 2024-10-02
Payer: MEDICAID

## 2024-10-02 ENCOUNTER — ANESTHESIA EVENT (OUTPATIENT)
Dept: PERIOP | Facility: HOSPITAL | Age: 59
End: 2024-10-02
Payer: MEDICAID

## 2024-10-02 ENCOUNTER — HOSPITAL ENCOUNTER (OUTPATIENT)
Facility: HOSPITAL | Age: 59
Setting detail: HOSPITAL OUTPATIENT SURGERY
Discharge: HOME | End: 2024-10-02
Attending: ORTHOPAEDIC SURGERY | Admitting: ORTHOPAEDIC SURGERY
Payer: MEDICAID

## 2024-10-02 VITALS
OXYGEN SATURATION: 96 % | BODY MASS INDEX: 27.04 KG/M2 | SYSTOLIC BLOOD PRESSURE: 140 MMHG | RESPIRATION RATE: 13 BRPM | HEART RATE: 74 BPM | WEIGHT: 204 LBS | TEMPERATURE: 97.8 F | HEIGHT: 73 IN | DIASTOLIC BLOOD PRESSURE: 92 MMHG

## 2024-10-02 DIAGNOSIS — S42.021A CLOSED DISPLACED FRACTURE OF SHAFT OF RIGHT CLAVICLE, INITIAL ENCOUNTER: Primary | ICD-10-CM

## 2024-10-02 PROCEDURE — 25010000002 ONDANSETRON PER 1 MG: Performed by: NURSE ANESTHETIST, CERTIFIED REGISTERED

## 2024-10-02 PROCEDURE — C1713 ANCHOR/SCREW BN/BN,TIS/BN: HCPCS | Performed by: ORTHOPAEDIC SURGERY

## 2024-10-02 PROCEDURE — 25010000002 PROPOFOL 200 MG/20ML EMULSION: Performed by: NURSE ANESTHETIST, CERTIFIED REGISTERED

## 2024-10-02 PROCEDURE — 25010000002 BUPIVACAINE (PF) 0.25 % SOLUTION 30 ML VIAL: Performed by: ORTHOPAEDIC SURGERY

## 2024-10-02 PROCEDURE — 73000 X-RAY EXAM OF COLLAR BONE: CPT

## 2024-10-02 PROCEDURE — 25010000002 FENTANYL CITRATE (PF) 50 MCG/ML SOLUTION: Performed by: NURSE ANESTHETIST, CERTIFIED REGISTERED

## 2024-10-02 PROCEDURE — 25810000003 LACTATED RINGERS PER 1000 ML: Performed by: NURSE ANESTHETIST, CERTIFIED REGISTERED

## 2024-10-02 PROCEDURE — 25010000002 LIDOCAINE 1 % SOLUTION 20 ML VIAL: Performed by: ORTHOPAEDIC SURGERY

## 2024-10-02 PROCEDURE — 23515 OPTX CLAVICULAR FX W/INT FIX: CPT | Performed by: ORTHOPAEDIC SURGERY

## 2024-10-02 PROCEDURE — 76000 FLUOROSCOPY <1 HR PHYS/QHP: CPT

## 2024-10-02 PROCEDURE — 25010000002 LIDOCAINE PF 2% 2 % SOLUTION: Performed by: NURSE ANESTHETIST, CERTIFIED REGISTERED

## 2024-10-02 PROCEDURE — 25010000002 SUGAMMADEX 200 MG/2ML SOLUTION: Performed by: NURSE ANESTHETIST, CERTIFIED REGISTERED

## 2024-10-02 PROCEDURE — 23515 OPTX CLAVICULAR FX W/INT FIX: CPT | Performed by: PHYSICIAN ASSISTANT

## 2024-10-02 PROCEDURE — 25010000002 HYDROMORPHONE 1 MG/ML SOLUTION: Performed by: NURSE ANESTHETIST, CERTIFIED REGISTERED

## 2024-10-02 PROCEDURE — 25010000002 MIDAZOLAM PER 1 MG: Performed by: NURSE ANESTHETIST, CERTIFIED REGISTERED

## 2024-10-02 PROCEDURE — 25010000002 CEFAZOLIN PER 500 MG: Performed by: ORTHOPAEDIC SURGERY

## 2024-10-02 PROCEDURE — 76000 FLUOROSCOPY <1 HR PHYS/QHP: CPT | Performed by: ORTHOPAEDIC SURGERY

## 2024-10-02 PROCEDURE — 25010000002 DEXAMETHASONE PER 1 MG: Performed by: NURSE ANESTHETIST, CERTIFIED REGISTERED

## 2024-10-02 DEVICE — DBM ALLOSYNC PURE 5CC: Type: IMPLANTABLE DEVICE | Site: CLAVICLE | Status: FUNCTIONAL

## 2024-10-02 DEVICE — BONE SCREW
Type: IMPLANTABLE DEVICE | Site: CLAVICLE | Status: FUNCTIONAL
Brand: VARIAX

## 2024-10-02 DEVICE — SUPERIOR PLATE - DECREASED CURVATURE
Type: IMPLANTABLE DEVICE | Site: CLAVICLE | Status: FUNCTIONAL
Brand: VARIAX

## 2024-10-02 RX ORDER — ONDANSETRON 2 MG/ML
4 INJECTION INTRAMUSCULAR; INTRAVENOUS ONCE AS NEEDED
Status: COMPLETED | OUTPATIENT
Start: 2024-10-02 | End: 2024-10-02

## 2024-10-02 RX ORDER — DIPHENHYDRAMINE HYDROCHLORIDE 50 MG/ML
12.5 INJECTION INTRAMUSCULAR; INTRAVENOUS ONCE AS NEEDED
Status: DISCONTINUED | OUTPATIENT
Start: 2024-10-02 | End: 2024-10-02 | Stop reason: HOSPADM

## 2024-10-02 RX ORDER — SODIUM CHLORIDE 0.9 % (FLUSH) 0.9 %
10 SYRINGE (ML) INJECTION AS NEEDED
Status: DISCONTINUED | OUTPATIENT
Start: 2024-10-02 | End: 2024-10-02 | Stop reason: HOSPADM

## 2024-10-02 RX ORDER — LABETALOL HYDROCHLORIDE 5 MG/ML
5 INJECTION, SOLUTION INTRAVENOUS
Status: DISCONTINUED | OUTPATIENT
Start: 2024-10-02 | End: 2024-10-02 | Stop reason: HOSPADM

## 2024-10-02 RX ORDER — DEXAMETHASONE SODIUM PHOSPHATE 4 MG/ML
INJECTION, SOLUTION INTRA-ARTICULAR; INTRALESIONAL; INTRAMUSCULAR; INTRAVENOUS; SOFT TISSUE AS NEEDED
Status: DISCONTINUED | OUTPATIENT
Start: 2024-10-02 | End: 2024-10-02 | Stop reason: SURG

## 2024-10-02 RX ORDER — DIPHENHYDRAMINE HYDROCHLORIDE 50 MG/ML
12.5 INJECTION INTRAMUSCULAR; INTRAVENOUS
Status: DISCONTINUED | OUTPATIENT
Start: 2024-10-02 | End: 2024-10-02 | Stop reason: HOSPADM

## 2024-10-02 RX ORDER — PHENYLEPHRINE HCL IN 0.9% NACL 1 MG/10 ML
SYRINGE (ML) INTRAVENOUS AS NEEDED
Status: DISCONTINUED | OUTPATIENT
Start: 2024-10-02 | End: 2024-10-02 | Stop reason: SURG

## 2024-10-02 RX ORDER — SODIUM CHLORIDE, SODIUM LACTATE, POTASSIUM CHLORIDE, CALCIUM CHLORIDE 600; 310; 30; 20 MG/100ML; MG/100ML; MG/100ML; MG/100ML
INJECTION, SOLUTION INTRAVENOUS CONTINUOUS PRN
Status: DISCONTINUED | OUTPATIENT
Start: 2024-10-02 | End: 2024-10-02 | Stop reason: SURG

## 2024-10-02 RX ORDER — IPRATROPIUM BROMIDE AND ALBUTEROL SULFATE 2.5; .5 MG/3ML; MG/3ML
3 SOLUTION RESPIRATORY (INHALATION) ONCE AS NEEDED
Status: DISCONTINUED | OUTPATIENT
Start: 2024-10-02 | End: 2024-10-02 | Stop reason: HOSPADM

## 2024-10-02 RX ORDER — MIDAZOLAM HYDROCHLORIDE 1 MG/ML
INJECTION INTRAMUSCULAR; INTRAVENOUS AS NEEDED
Status: DISCONTINUED | OUTPATIENT
Start: 2024-10-02 | End: 2024-10-02 | Stop reason: SURG

## 2024-10-02 RX ORDER — NALOXONE HCL 0.4 MG/ML
0.4 VIAL (ML) INJECTION AS NEEDED
Status: DISCONTINUED | OUTPATIENT
Start: 2024-10-02 | End: 2024-10-02 | Stop reason: HOSPADM

## 2024-10-02 RX ORDER — PROPOFOL 10 MG/ML
INJECTION, EMULSION INTRAVENOUS AS NEEDED
Status: DISCONTINUED | OUTPATIENT
Start: 2024-10-02 | End: 2024-10-02 | Stop reason: SURG

## 2024-10-02 RX ORDER — OXYCODONE HYDROCHLORIDE 5 MG/1
10 TABLET ORAL EVERY 4 HOURS PRN
Status: COMPLETED | OUTPATIENT
Start: 2024-10-02 | End: 2024-10-02

## 2024-10-02 RX ORDER — EPHEDRINE SULFATE 5 MG/ML
5 INJECTION INTRAVENOUS ONCE AS NEEDED
Status: DISCONTINUED | OUTPATIENT
Start: 2024-10-02 | End: 2024-10-02 | Stop reason: HOSPADM

## 2024-10-02 RX ORDER — SODIUM CHLORIDE, SODIUM LACTATE, POTASSIUM CHLORIDE, CALCIUM CHLORIDE 600; 310; 30; 20 MG/100ML; MG/100ML; MG/100ML; MG/100ML
1000 INJECTION, SOLUTION INTRAVENOUS CONTINUOUS
Status: DISCONTINUED | OUTPATIENT
Start: 2024-10-02 | End: 2024-10-02 | Stop reason: HOSPADM

## 2024-10-02 RX ORDER — HYDRALAZINE HYDROCHLORIDE 20 MG/ML
5 INJECTION INTRAMUSCULAR; INTRAVENOUS
Status: DISCONTINUED | OUTPATIENT
Start: 2024-10-02 | End: 2024-10-02 | Stop reason: HOSPADM

## 2024-10-02 RX ORDER — ROCURONIUM BROMIDE 10 MG/ML
INJECTION, SOLUTION INTRAVENOUS AS NEEDED
Status: DISCONTINUED | OUTPATIENT
Start: 2024-10-02 | End: 2024-10-02 | Stop reason: SURG

## 2024-10-02 RX ORDER — FLUMAZENIL 0.1 MG/ML
0.2 INJECTION INTRAVENOUS AS NEEDED
Status: DISCONTINUED | OUTPATIENT
Start: 2024-10-02 | End: 2024-10-02 | Stop reason: HOSPADM

## 2024-10-02 RX ORDER — FENTANYL CITRATE 50 UG/ML
50 INJECTION, SOLUTION INTRAMUSCULAR; INTRAVENOUS
Status: DISCONTINUED | OUTPATIENT
Start: 2024-10-02 | End: 2024-10-02 | Stop reason: HOSPADM

## 2024-10-02 RX ORDER — DEXMEDETOMIDINE HYDROCHLORIDE 100 UG/ML
INJECTION, SOLUTION INTRAVENOUS AS NEEDED
Status: DISCONTINUED | OUTPATIENT
Start: 2024-10-02 | End: 2024-10-02 | Stop reason: SURG

## 2024-10-02 RX ORDER — OXYCODONE HYDROCHLORIDE 5 MG/1
5 TABLET ORAL ONCE AS NEEDED
Status: DISCONTINUED | OUTPATIENT
Start: 2024-10-02 | End: 2024-10-02 | Stop reason: HOSPADM

## 2024-10-02 RX ORDER — LIDOCAINE HYDROCHLORIDE 10 MG/ML
0.5 INJECTION, SOLUTION EPIDURAL; INFILTRATION; INTRACAUDAL; PERINEURAL ONCE AS NEEDED
Status: DISCONTINUED | OUTPATIENT
Start: 2024-10-02 | End: 2024-10-02 | Stop reason: HOSPADM

## 2024-10-02 RX ADMIN — ONDANSETRON 8 MG: 2 INJECTION INTRAMUSCULAR; INTRAVENOUS at 13:30

## 2024-10-02 RX ADMIN — ROCURONIUM BROMIDE 50 MG: 10 INJECTION, SOLUTION INTRAVENOUS at 13:15

## 2024-10-02 RX ADMIN — DEXMEDETOMIDINE HYDROCHLORIDE 8 MCG: 100 INJECTION, SOLUTION INTRAVENOUS at 14:24

## 2024-10-02 RX ADMIN — DEXMEDETOMIDINE HYDROCHLORIDE 8 MCG: 100 INJECTION, SOLUTION INTRAVENOUS at 13:50

## 2024-10-02 RX ADMIN — OXYCODONE 10 MG: 5 TABLET ORAL at 15:30

## 2024-10-02 RX ADMIN — HYDROMORPHONE HYDROCHLORIDE 1 MG: 1 INJECTION, SOLUTION INTRAMUSCULAR; INTRAVENOUS; SUBCUTANEOUS at 14:50

## 2024-10-02 RX ADMIN — PROPOFOL 200 MG: 10 INJECTION, EMULSION INTRAVENOUS at 13:12

## 2024-10-02 RX ADMIN — FENTANYL CITRATE 50 MCG: 50 INJECTION, SOLUTION INTRAMUSCULAR; INTRAVENOUS at 13:45

## 2024-10-02 RX ADMIN — Medication 100 MCG: at 13:59

## 2024-10-02 RX ADMIN — ROCURONIUM BROMIDE 10 MG: 10 INJECTION, SOLUTION INTRAVENOUS at 13:58

## 2024-10-02 RX ADMIN — Medication 100 MCG: at 13:26

## 2024-10-02 RX ADMIN — FENTANYL CITRATE 50 MCG: 50 INJECTION, SOLUTION INTRAMUSCULAR; INTRAVENOUS at 15:31

## 2024-10-02 RX ADMIN — FENTANYL CITRATE 50 MCG: 50 INJECTION, SOLUTION INTRAMUSCULAR; INTRAVENOUS at 15:04

## 2024-10-02 RX ADMIN — FENTANYL CITRATE 50 MCG: 50 INJECTION, SOLUTION INTRAMUSCULAR; INTRAVENOUS at 13:12

## 2024-10-02 RX ADMIN — DEXAMETHASONE SODIUM PHOSPHATE 8 MG: 4 INJECTION, SOLUTION INTRAMUSCULAR; INTRAVENOUS at 13:30

## 2024-10-02 RX ADMIN — SUGAMMADEX 200 MG: 100 INJECTION, SOLUTION INTRAVENOUS at 14:31

## 2024-10-02 RX ADMIN — SODIUM CHLORIDE, SODIUM LACTATE, POTASSIUM CHLORIDE, AND CALCIUM CHLORIDE: .6; .31; .03; .02 INJECTION, SOLUTION INTRAVENOUS at 13:06

## 2024-10-02 RX ADMIN — LIDOCAINE HYDROCHLORIDE 100 MG: 20 INJECTION, SOLUTION EPIDURAL; INFILTRATION; INTRACAUDAL; PERINEURAL at 13:12

## 2024-10-02 RX ADMIN — SODIUM CHLORIDE 2 G: 900 INJECTION INTRAVENOUS at 13:20

## 2024-10-02 RX ADMIN — SODIUM CHLORIDE 2 G: 900 INJECTION INTRAVENOUS at 13:04

## 2024-10-02 RX ADMIN — MIDAZOLAM 2 MG: 1 INJECTION INTRAMUSCULAR; INTRAVENOUS at 13:06

## 2024-10-02 NOTE — ANESTHESIA POSTPROCEDURE EVALUATION
Patient: Neftali Rodrigues    Procedure Summary       Date: 10/02/24 Room / Location: [facility] OR 11 / [facility] MAIN OR    Anesthesia Start: 1306 Anesthesia Stop: 1446    Procedure: CLAVICLE OPEN REDUCTION INTERNAL FIXATION (Right: Shoulder) Diagnosis:       Closed displaced fracture of shaft of right clavicle, initial encounter      (Closed displaced fracture of shaft of right clavicle, initial encounter [S42.021A])    Surgeons: Lloyd Muro MD Provider: Liliya Bullock MD    Anesthesia Type: general ASA Status: 3            Anesthesia Type: general    Vitals  Vitals Value Taken Time   /94 10/02/24 1602   Temp 97.7 °F (36.5 °C) 10/02/24 1557   Pulse 71 10/02/24 1603   Resp 11 10/02/24 1557   SpO2 93 % 10/02/24 1603   Vitals shown include unfiled device data.        Post Anesthesia Care and Evaluation    Patient location during evaluation: PACU  Patient participation: complete - patient participated  Level of consciousness: awake  Pain score: 0  Pain management: adequate  Anesthetic complications: No anesthetic complications  PONV Status: none  Cardiovascular status: acceptable  Respiratory status: acceptable  Hydration status: acceptable
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
## 2024-10-02 NOTE — ANESTHESIA PROCEDURE NOTES
Airway  Urgency: elective    Date/Time: 10/2/2024 1:14 PM  Airway not difficult    General Information and Staff    Patient location during procedure: OR  CRNA/CAA: Regina Barbosa CRNA    Indications and Patient Condition  Indications for airway management: airway protection    Preoxygenated: yes  Mask difficulty assessment: 0 - not attempted    Final Airway Details  Final airway type: endotracheal airway      Successful airway: ETT  Cuffed: yes   Successful intubation technique: direct laryngoscopy  Facilitating devices/methods: cricoid pressure and intubating stylet  Endotracheal tube insertion site: oral  Blade: Arteaga  Blade size: 2  ETT size (mm): 7.5  Cormack-Lehane Classification: grade I - full view of glottis  Placement verified by: chest auscultation and capnometry   Measured from: lips  Number of attempts at approach: 1  Assessment: lips, teeth, and gum same as pre-op and atraumatic intubation

## 2024-10-03 ENCOUNTER — TELEPHONE (OUTPATIENT)
Dept: ORTHOPEDIC SURGERY | Facility: CLINIC | Age: 59
End: 2024-10-03
Payer: MEDICAID

## 2024-10-03 DIAGNOSIS — S42.021A CLOSED DISPLACED FRACTURE OF SHAFT OF RIGHT CLAVICLE, INITIAL ENCOUNTER: ICD-10-CM

## 2024-10-03 RX ORDER — PROMETHAZINE HYDROCHLORIDE 12.5 MG/1
12.5 TABLET ORAL EVERY 6 HOURS PRN
Qty: 21 TABLET | Refills: 1 | Status: SHIPPED | OUTPATIENT
Start: 2024-10-03

## 2024-10-03 RX ORDER — OXYCODONE AND ACETAMINOPHEN 5; 325 MG/1; MG/1
1 TABLET ORAL EVERY 6 HOURS PRN
Qty: 28 TABLET | Refills: 0 | Status: SHIPPED | OUTPATIENT
Start: 2024-10-03

## 2024-10-03 NOTE — OP NOTE
CLAVICLE OPEN REDUCTION INTERNAL FIXATION  Procedure Report    Patient Name:  Neftali Rodrigues  YOB: 1965    Date of Surgery:  10/2/2024     Indications: This is a 59 y.o. male with an injury to the right clavicle.  Imaging demonstrated displaced fracture.Treatment options were discussed.  They desired to proceed with open reduction internal fixation after discussing the risks including bleeding, scarring,infection, stiffness, nerve damage, tendon damage, artery damage, continued pain, DVT, malunion, nonunion, loss of life or limb, and a need for further surgery including hardware removal.      Pre-op Diagnosis:   Closed displaced fracture of shaft of right clavicle, initial encounter [S42.021A]       Post-op Diagnosis:    Post-Op Diagnosis Codes:     * Closed displaced fracture of shaft of right clavicle, initial encounter [S42.021A]    Procedure/CPT® Codes: 89411    Procedure(s): Right  CLAVICLE OPEN REDUCTION INTERNAL FIXATION    Assistant: Yousuf Barry physician assistant    was responsible for performing the following activities: Retraction, Suction, Irrigation, Suturing, Closing, and Placing Dressing and their skilled assistance was necessary for the success of this case.         Anesthesia: General with Block    IVF: See anesthesia record    Estimated Blood Loss:  10 ml    Implants:    Implant Name Type Inv. Item Serial No.  Lot No. LRB No. Used Action   DBM ALLOSYNC PURE 5CC - [id number] - [id number] Implant DBM ALLOSYNC PURE 5CC [id number] . Right 1 Implanted   SCRW BONE VARIAX 3.5X24MM - [id number] Implant SCRW BONE VARIAX 3.5X24MM  [employer] . Right 1 Implanted   PLT CLAV VARIAX SUPR DECREASE CRV 8H 99MM RT - [id number] Implant PLT CLAV VARIAX SUPR DECREASE CRV 8H 99MM RT  [employer] . Right 1 Implanted   SCRW BONE VARIAX T10 3.5X16MM - [id number] Implant SCRW BONE VARIAX T10 3.5X16MM  [employer] . Right 3 Implanted   SCRW BONE VARIAX T10 3.5X18MM -  [id number] Implant SCRW BONE VARIAX T10 3.5X18MM  [employer] . Right 3 Implanted         Complications: None    Specimens:none    Description of Procedure: The patient's operative site was marked.  Regional  anesthesia was administered.  Patient was brought to the operating room and placed on the operating room table.  General anesthesia was administered. Antibiotics were dosed.  A timeout was taken to confirm the correct operative site and procedure.    The shoulder was then prepped and draped in a standard surgical fashion.    Incision was marked, injected with local anesthetic and opened over the fracture.  Flaps were raised and the fracture was identified, cleaned of debris and fibrinous material within reduced and secured with a lag screw.  A plate was secured and fluoroscopy confirmed initial provisional fixation with fracture reduction.  Remaining screws were filled as appropriate and final fluoroscopy confirmed reduction of the fracture and hardware in position.      Wound was irrigated and closed in layers with suture and glue and a sterile dressing applied. Patient was awakened and taken to the recovery room.  There were no complications.  I was present for all portions.  Counts were correct.  Good capillary refill was noted of the digits.        Lloyd Muro MD     Date: 10/3/2024  Time: 08:05 EDT

## 2024-10-03 NOTE — TELEPHONE ENCOUNTER
Caller: Lawanda Moeller    Relationship: Emergency Contact    Best call back number: [phone]    Requested Prescriptions:   PHENEGRAN 12.5 MG, OXYCODONE 5-325 MG, CEFAZOLIN 2000 MG    Pharmacy where request should be sent: [facility]/PHARMACY #[id number] - [place], [place] - [address] - [phone] [facility] [phone] FX     Last office visit with prescribing clinician: Visit date not found   Last telemedicine visit with prescribing clinician: Visit date not found   Next office visit with prescribing clinician: 10/17/2024     Additional details provided by patient: PT WAS UNABLE TO GET MEDICATIONS FROM [facility] YESTERDAY DUE TO [facility] NOT ACCEPTING PT'S INSURANCE. PLEASE SEND RX TO [facility] LISTED ABOVE INSTEAD.    Does the patient have less than a 3 day supply:  [x] Yes  [] No    Would you like a call back once the refill request has been completed: [x] Yes [] No    If the office needs to give you a call back, can they leave a voicemail: [x] Yes [] No    Tristen Bryson   10/03/24 11:07 EDT

## 2024-10-08 ENCOUNTER — TELEPHONE (OUTPATIENT)
Dept: ORTHOPEDIC SURGERY | Facility: CLINIC | Age: 59
End: 2024-10-08
Payer: MEDICAID

## 2024-10-08 DIAGNOSIS — S42.021A CLOSED DISPLACED FRACTURE OF SHAFT OF RIGHT CLAVICLE, INITIAL ENCOUNTER: ICD-10-CM

## 2024-10-08 LAB
QT INTERVAL: 368 MS
QTC INTERVAL: 440 MS

## 2024-10-08 NOTE — TELEPHONE ENCOUNTER
"Caller: Neftali Rodrigues \"LOBITO\"    Relationship: Self    Best call back number: [phone]*    Requested Prescriptions: OXYCODONE     Pharmacy where request should be sent:  [facility]/pharmacy #[id number] - [place], [place] - [address] - [phone]  - [phone]  [phone]     Last office visit with prescribing clinician: Visit date not found   Last telemedicine visit with prescribing clinician: Visit date not found   Next office visit with prescribing clinician: 10/17/2024     Additional details provided by patient: PT IS OUT OF MEDICATION.. PLEASE ADVISE..    Does the patient have less than a 3 day supply:  [x] Yes  [] No    Would you like a call back once the refill request has been completed: [x] Yes [] No    If the office needs to give you a call back, can they leave a voicemail: [x] Yes [] No    Lei Quintero   10/08/24 13:32 EDT       "

## 2024-10-09 RX ORDER — OXYCODONE AND ACETAMINOPHEN 5; 325 MG/1; MG/1
1 TABLET ORAL EVERY 6 HOURS PRN
Qty: 28 TABLET | Refills: 0 | Status: SHIPPED | OUTPATIENT
Start: 2024-10-09

## 2024-10-17 ENCOUNTER — OFFICE VISIT (OUTPATIENT)
Dept: ORTHOPEDIC SURGERY | Facility: CLINIC | Age: 59
End: 2024-10-17
Payer: MEDICAID

## 2024-10-17 VITALS — WEIGHT: 204 LBS | HEART RATE: 78 BPM | HEIGHT: 73 IN | BODY MASS INDEX: 27.04 KG/M2

## 2024-10-17 DIAGNOSIS — S42.021A CLOSED DISPLACED FRACTURE OF SHAFT OF RIGHT CLAVICLE, INITIAL ENCOUNTER: ICD-10-CM

## 2024-10-17 DIAGNOSIS — Z47.89 ORTHOPEDIC AFTERCARE: Primary | ICD-10-CM

## 2024-10-17 PROCEDURE — 99024 POSTOP FOLLOW-UP VISIT: CPT | Performed by: ORTHOPAEDIC SURGERY

## 2024-10-17 NOTE — PROGRESS NOTES
Patient ID: Neftali Rodrigues is a 59 y.o. male.  10/2/14 ORIF right clavicle  Pain minimal not wearing his sling    Objective:    There were no vitals taken for this visit.    Physical Examination:    Incision healing well no sign of infection  Sensory and motor exam are intact all distributions. Radial pulse is palpable and capillary refill is less than two seconds to all digits.    Imaging:  right Clavicle X-Ray  Indication: Postop ORIF clavicle  Ap and oblique  Findings: Maintenance of fracture reduction hardware in position  no bony lesion  Soft tissues normal  not examined joint spaces  Hardware appropriately positioned yes      yes prior studies available for comparison.    Assessment:  Doing well after ORIF clavicle    Plan:  Discussed the importance of sling use and what ever restrictions he can shower use his arm for personal care below head level x-ray in a month

## 2024-10-18 RX ORDER — OXYCODONE AND ACETAMINOPHEN 5; 325 MG/1; MG/1
1 TABLET ORAL EVERY 6 HOURS PRN
Qty: 28 TABLET | Refills: 0 | Status: SHIPPED | OUTPATIENT
Start: 2024-10-18

## 2024-10-21 ENCOUNTER — TELEPHONE (OUTPATIENT)
Dept: ORTHOPEDIC SURGERY | Facility: CLINIC | Age: 59
End: 2024-10-21
Payer: MEDICAID

## 2024-10-21 NOTE — TELEPHONE ENCOUNTER
PATIENT CALLED IN TO REQUEST REFILL ON HIS OXYCODONE TO BE SENT TO [facility] IN [place]. PLEASE CALL PATIENT [phone]

## 2024-10-22 ENCOUNTER — TELEPHONE (OUTPATIENT)
Dept: ORTHOPEDIC SURGERY | Facility: CLINIC | Age: 59
End: 2024-10-22
Payer: MEDICAID

## 2024-10-22 NOTE — TELEPHONE ENCOUNTER
I spoke to [facility] pharmacist and they advised the insurance is not allowing another fill until 10/24/2024.  Or the patient can pay out of pocket of $42.19.  Patient's wife was advised they will need to call pharmacy back on 10/24/2024 if they decide not to pay out of pocket.

## 2024-10-22 NOTE — TELEPHONE ENCOUNTER
Caller: Lawanda Moeller    Relationship: Emergency Contact    Best call back number: [phone]    What was the call regarding: PT'S SIGNIFICANT OTHER CALLING TO RELAY THAT THEY HAVE SPOKEN WITH THE [facility] WHERE PAIN RX WAS SENT 10/18/24. [facility] STATES THERE IS AN ISSUE WITH THE RX AND [facility] NEEDS FOR THE DR VENTURA'S TEAM TO CONTACT THEM TO DISCUSS THE ISSUE. PLEASE CONTACT [facility] TO GET RX STRAIGHTENED OUT AND CONTACT PT AT THE NUMBER ABOVE WITH ANY UPDATES. PT IS OUT OF THE MEDICATION.

## 2024-10-24 ENCOUNTER — TELEPHONE (OUTPATIENT)
Dept: ORTHOPEDIC SURGERY | Facility: CLINIC | Age: 59
End: 2024-10-24
Payer: MEDICAID

## 2024-10-24 NOTE — TELEPHONE ENCOUNTER
I submitted PA through Cover my meds  insurance is stating it does not need a PA.  I called and LM for [facility] in [place] letting them know.  I asked them to call the office back if there was an issue.       I called and LM for patient letting him know that insurance is saying medication does not need a PA>

## 2024-10-24 NOTE — TELEPHONE ENCOUNTER
PATIENT CALLED IN STATING HE HAS BEEN TRYING TO GET A REFILL ON HIS PAIN MEDICATION. I TOLD HIM [facility] WOULD JUST NEED TO SEND US THE FORM FOR THE PRIOR AUTH. HE SAID THE TOLD HIM THEY HAD NOTHING TO DO WITH IT AND THE  OFFICE NEEDED TO REACH OUT TO THE INSURANCE.

## 2024-10-28 DIAGNOSIS — S42.021A CLOSED DISPLACED FRACTURE OF SHAFT OF RIGHT CLAVICLE, INITIAL ENCOUNTER: Primary | ICD-10-CM

## 2024-10-28 RX ORDER — OXYCODONE AND ACETAMINOPHEN 5; 325 MG/1; MG/1
1 TABLET ORAL EVERY 6 HOURS PRN
Qty: 28 TABLET | Refills: 0 | Status: SHIPPED | OUTPATIENT
Start: 2024-10-28

## 2024-10-28 NOTE — TELEPHONE ENCOUNTER
Lawanda patients partner called to let us know we need to get a PA through the patient's insurance for his pain rx not the pharmacy. I asked her to have insurance fax forms we need to fill out because they have not sent us anything to complete.   Patient # [phone]

## 2024-10-28 NOTE — TELEPHONE ENCOUNTER
Dr Muro, Can you send in a pain medication refill?  The patient elected not to pay out of pocket and was suppose to call pharmacy 10/24/2024.

## 2024-11-21 ENCOUNTER — OFFICE VISIT (OUTPATIENT)
Dept: ORTHOPEDIC SURGERY | Facility: CLINIC | Age: 59
End: 2024-11-21
Payer: MEDICAID

## 2024-11-21 VITALS — HEIGHT: 73 IN | BODY MASS INDEX: 27.04 KG/M2 | WEIGHT: 204 LBS | HEART RATE: 90 BPM

## 2024-11-21 DIAGNOSIS — Z47.89 ORTHOPEDIC AFTERCARE: Primary | ICD-10-CM

## 2024-11-21 PROCEDURE — 99024 POSTOP FOLLOW-UP VISIT: CPT | Performed by: ORTHOPAEDIC SURGERY

## 2024-11-21 RX ORDER — HYDROCODONE BITARTRATE AND ACETAMINOPHEN 5; 325 MG/1; MG/1
1 TABLET ORAL EVERY 6 HOURS PRN
Qty: 20 TABLET | Refills: 0 | Status: SHIPPED | OUTPATIENT
Start: 2024-11-21

## 2024-11-21 NOTE — PROGRESS NOTES
"     Patient ID: Neftali Rodrigues is a 59 y.o. male.    10/2/14 ORIF right clavicle   Pain controlled    Objective:    Pulse 90   Ht 185.4 cm (73\")   Wt 92.5 kg (204 lb)   BMI 26.91 kg/m²     Physical Examination:    Incision healed no sign of infection passive elevation 120    Imaging:  right Clavicle X-Ray  Indication: Postop ORIF clavicle  Ap and oblique  Findings: Maintenance of fracture reduction with callus progression there is backout of one of the medial screws  no bony lesion  Soft tissues normal  not examined joint spaces  Hardware appropriately positioned no      yes prior studies available for comparison    Assessment:  Early backout of a screw after clavicle ORIF    Plan:  Discussed the importance of activity restrictions if hardware remains overall stable hopefully he can continue callus progression.  I would like to see him back with x-ray in 6 weeks he can remove the sling for light personal care tasks  "

## 2024-12-19 ENCOUNTER — TELEPHONE (OUTPATIENT)
Dept: ORTHOPEDIC SURGERY | Facility: CLINIC | Age: 59
End: 2024-12-19
Payer: MEDICAID
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
## 2025-04-29 ENCOUNTER — TRANSCRIBE ORDERS (OUTPATIENT)
Dept: ADMINISTRATIVE | Facility: HOSPITAL | Age: 60
End: 2025-04-29
Payer: MEDICAID

## 2025-04-29 ENCOUNTER — HOSPITAL ENCOUNTER (OUTPATIENT)
Dept: GENERAL RADIOLOGY | Facility: HOSPITAL | Age: 60
Discharge: HOME | End: 2025-04-29

## 2025-04-29 DIAGNOSIS — Z02.71 ENCOUNTER FOR DISABILITY DETERMINATION: ICD-10-CM

## 2025-04-29 DIAGNOSIS — Z02.71 ENCOUNTER FOR DISABILITY DETERMINATION: Primary | ICD-10-CM

## 2025-04-29 PROCEDURE — 73030 X-RAY EXAM OF SHOULDER: CPT

## (undated) DEVICE — GLV SURG SIGNATURE ESSENTIAL PF LTX SZ8.5

## (undated) DEVICE — UNDERGLV SURG BIOGEL/PI PF SYNTH SURG SZ8.5 BLU 50/BX

## (undated) DEVICE — ADHS SKIN PREMIERPRO EXOFIN TOPICAL HI/VISC .5ML

## (undated) DEVICE — SOL IRRIG NACL 1000ML

## (undated) DEVICE — SPNG GZ AVANT 6PLY 4X4IN STRL PK/2

## (undated) DEVICE — UNDERGLV SURG BIOGEL INDICAT PI SZ8 BLU

## (undated) DEVICE — SPNG LAP PREWSH SFTPK 18X18IN STRL PK/5

## (undated) DEVICE — OVERDRILL AO, DIA3.5MM X 122MM: Brand: VARIAX

## (undated) DEVICE — SOLUTION,WATER,IRRIGATION,1000ML,STERILE: Brand: MEDLINE

## (undated) DEVICE — GLV SURG SENSICARE PI ORTHO SZ8 LF STRL

## (undated) DEVICE — GOWN,REINFORCE,POLY,SIRUS,BREATH SLV,XLG: Brand: MEDLINE

## (undated) DEVICE — ANTIBACTERIAL UNDYED BRAIDED (POLYGLACTIN 910), SYNTHETIC ABSORBABLE SURGICAL SUTURE: Brand: COATED VICRYL

## (undated) DEVICE — 3M™ IOBAN™ 2 ANTIMICROBIAL INCISE DRAPE 6650EZ: Brand: IOBAN™ 2

## (undated) DEVICE — SUT MNCRYL 3/0 PS2 18IN MCP497G

## (undated) DEVICE — YANKAUER,POOLE TIP,STERILE,50/CS: Brand: MEDLINE

## (undated) DEVICE — DECANTER: Brand: UNBRANDED

## (undated) DEVICE — DRAPE,TOWEL,LARGE,INVISISHIELD: Brand: MEDLINE

## (undated) DEVICE — DRP SURG U/DRP INVISISHIELD PA 48X52IN

## (undated) DEVICE — ANTIBACTERIAL UNDYED BRAIDED (POLYGLACTIN 910), SYNTHETIC ABSORBABLE SUTURE: Brand: COATED VICRYL

## (undated) DEVICE — C-ARM: Brand: UNBRANDED

## (undated) DEVICE — PAD,NON-ADHERENT,3X8,STERILE,LF,1/PK: Brand: MEDLINE

## (undated) DEVICE — DRAPE,LAPAROTOMY,PCH,STERILE: Brand: MEDLINE

## (undated) DEVICE — UNDRGLV SURG BIOGEL PIMICROINDICATOR SYNTH SZ8 LF STRL

## (undated) DEVICE — KT SURG TURNOVER 050

## (undated) DEVICE — PK EXTREM 50

## (undated) DEVICE — DRILL BIT, AO DIA2.0MM X 135MM, SCALED: Brand: VARIAX

## (undated) DEVICE — DRSNG SURESITE123 6X8IN